# Patient Record
Sex: FEMALE | Race: WHITE | NOT HISPANIC OR LATINO | URBAN - NONMETROPOLITAN AREA
[De-identification: names, ages, dates, MRNs, and addresses within clinical notes are randomized per-mention and may not be internally consistent; named-entity substitution may affect disease eponyms.]

---

## 2020-08-31 ENCOUNTER — WEB ENCOUNTER (OUTPATIENT)
Dept: URBAN - NONMETROPOLITAN AREA CLINIC 2 | Facility: CLINIC | Age: 70
End: 2020-08-31

## 2020-08-31 ENCOUNTER — LAB OUTSIDE AN ENCOUNTER (OUTPATIENT)
Dept: URBAN - NONMETROPOLITAN AREA CLINIC 2 | Facility: CLINIC | Age: 70
End: 2020-08-31

## 2020-08-31 ENCOUNTER — OFFICE VISIT (OUTPATIENT)
Dept: URBAN - NONMETROPOLITAN AREA CLINIC 2 | Facility: CLINIC | Age: 70
End: 2020-08-31
Payer: MEDICARE

## 2020-08-31 DIAGNOSIS — K50.10 CROHN'S DISEASE OF COLON WITHOUT COMPLICATION: ICD-10-CM

## 2020-08-31 PROCEDURE — 99204 OFFICE O/P NEW MOD 45 MIN: CPT | Performed by: NURSE PRACTITIONER

## 2020-08-31 PROCEDURE — 1036F TOBACCO NON-USER: CPT | Performed by: NURSE PRACTITIONER

## 2020-08-31 PROCEDURE — 3017F COLORECTAL CA SCREEN DOC REV: CPT | Performed by: NURSE PRACTITIONER

## 2020-08-31 PROCEDURE — G8427 DOCREV CUR MEDS BY ELIG CLIN: HCPCS | Performed by: NURSE PRACTITIONER

## 2020-08-31 RX ORDER — ADALIMUMAB 40MG/0.8ML
0.8 ML KIT SUBCUTANEOUS EVERY OTHER WEEK
Status: ACTIVE | COMMUNITY

## 2020-08-31 RX ORDER — LORATADINE 10 MG
1 TABLET TABLET ORAL ONCE A DAY
Status: ACTIVE | COMMUNITY

## 2020-08-31 RX ORDER — ADALIMUMAB 40MG/0.4ML
EVERY 2 WEEKS KIT SUBCUTANEOUS
Qty: 2 PRE-FILLED PEN SYRINGE | Refills: 11 | OUTPATIENT
Start: 2020-08-31 | End: 2021-08-26

## 2020-08-31 RX ORDER — SITAGLIPTIN AND METFORMIN HYDROCHLORIDE 50; 1000 MG/1; MG/1
1 TABLET WITH MEALS TABLET, FILM COATED ORAL TWICE A DAY
Status: ACTIVE | COMMUNITY

## 2020-08-31 RX ORDER — ARIPIPRAZOLE 5 MG/1
1/2 TABLET TABLET ORAL ONCE A DAY
Status: ACTIVE | COMMUNITY

## 2020-08-31 RX ORDER — CYANOCOBALAMIN 1000 UG/ML
1 ML INJECTION INTRAMUSCULAR; SUBCUTANEOUS
Status: ACTIVE | COMMUNITY

## 2020-08-31 RX ORDER — GABAPENTIN 300 MG/1
1 CAPSULE CAPSULE ORAL TWICE DAILY
Status: ACTIVE | COMMUNITY

## 2020-08-31 RX ORDER — VITAMIN A 2400 MCG
1 TABLET CAPSULE ORAL TWICE DAILY
Status: ACTIVE | COMMUNITY

## 2020-08-31 RX ORDER — LORAZEPAM 0.5 MG/1
1 TABLET TABLET ORAL AS NEEDED
Status: ACTIVE | COMMUNITY

## 2020-08-31 RX ORDER — LAMOTRIGINE 100 MG/1
1 TABLET TABLET ORAL ONCE A DAY
Status: ACTIVE | COMMUNITY

## 2020-08-31 RX ORDER — BUPROPION HYDROCHLORIDE 300 MG/1
1 TABLET IN THE MORNING TABLET, FILM COATED, EXTENDED RELEASE ORAL ONCE A DAY
Status: ACTIVE | COMMUNITY

## 2020-08-31 RX ORDER — GLIMEPIRIDE 4 MG/1
1 TABLET WITH BREAKFAST OR THE FIRST MAIN MEAL OF THE DAY TABLET ORAL TWICE DAILY
Status: ACTIVE | COMMUNITY

## 2020-08-31 NOTE — HPI-TODAY'S VISIT:
Marybeth presents for evaluation of Crohn's colitis.  She was originally diagnosed in 2018 by Dr. Robbin Mix per her report, she states that she developed acute diarrhea and was told that she had Crohn's colitis.  She was never treated with any remission therapy, she thinks that she took steroids.  She had no issues over the past 2 years but recently has had increased tenesmus and urgency with incontinence.  She had a repeat colonoscopy in June by Dr. Robbin Mix with active Crohn's colitis.  He recommended Humira which she started 2 weeks ago.  She has taken her first 2 induction doses, she is due for maintenance.  She is switching providers given issues with communication with Dr. Mix's office.  She states that she has a bowel movement most days of the week with urgency and tenesmus, at times she has 2-3 urgent bowel movements in a day, however she does have episodes of incomplete evacuation in between these periods.  She denies any mucus or bleeding.  We do not have a copy of her records from Dr. Mix.  She has not had any blood work recently.  Today she is here to establish care, and she is otherwise doing well.  MB

## 2020-09-01 LAB
A/G RATIO: 1.8
ALBUMIN: 4.6
ALKALINE PHOSPHATASE: 71
ALT (SGPT): 24
AST (SGOT): 22
BASO (ABSOLUTE): 0.1
BASOS: 1
BILIRUBIN, TOTAL: 0.3
BUN/CREATININE RATIO: 17
BUN: 18
C-REACTIVE PROTEIN, QUANT: 2
CALCIUM: 10.2
CARBON DIOXIDE, TOTAL: 21
CHLORIDE: 100
CREATININE: 1.04
EGFR IF AFRICN AM: 64
EGFR IF NONAFRICN AM: 56
EOS (ABSOLUTE): 0.3
EOS: 4
GLOBULIN, TOTAL: 2.5
GLUCOSE: 140
HEMATOCRIT: 42
HEMATOLOGY COMMENTS:: (no result)
HEMOGLOBIN: 13
IMMATURE CELLS: (no result)
IMMATURE GRANS (ABS): 0
IMMATURE GRANULOCYTES: 0
LYMPHS (ABSOLUTE): 3.1
LYMPHS: 33
MCH: 26.2
MCHC: 31
MCV: 85
MONOCYTES(ABSOLUTE): 0.6
MONOCYTES: 7
NEUTROPHILS (ABSOLUTE): 5.2
NEUTROPHILS: 55
NRBC: (no result)
PLATELETS: 197
POTASSIUM: 4.7
PROTEIN, TOTAL: 7.1
RBC: 4.97
RDW: 15
SEDIMENTATION RATE-WESTERGREN: 14
SODIUM: 139
WBC: 9.3

## 2020-09-08 ENCOUNTER — OFFICE VISIT (OUTPATIENT)
Dept: URBAN - METROPOLITAN AREA TELEHEALTH 2 | Facility: TELEHEALTH | Age: 70
End: 2020-09-08

## 2020-10-19 ENCOUNTER — OFFICE VISIT (OUTPATIENT)
Dept: URBAN - NONMETROPOLITAN AREA CLINIC 2 | Facility: CLINIC | Age: 70
End: 2020-10-19
Payer: MEDICARE

## 2020-10-19 ENCOUNTER — TELEPHONE ENCOUNTER (OUTPATIENT)
Dept: URBAN - METROPOLITAN AREA CLINIC 92 | Facility: CLINIC | Age: 70
End: 2020-10-19

## 2020-10-19 VITALS
BODY MASS INDEX: 44.99 KG/M2 | TEMPERATURE: 97.8 F | DIASTOLIC BLOOD PRESSURE: 72 MMHG | SYSTOLIC BLOOD PRESSURE: 124 MMHG | HEIGHT: 56 IN | HEART RATE: 66 BPM | WEIGHT: 200 LBS

## 2020-10-19 DIAGNOSIS — K50.10 CROHN'S DISEASE OF COLON WITHOUT COMPLICATION: ICD-10-CM

## 2020-10-19 PROCEDURE — G8427 DOCREV CUR MEDS BY ELIG CLIN: HCPCS | Performed by: NURSE PRACTITIONER

## 2020-10-19 PROCEDURE — 99213 OFFICE O/P EST LOW 20 MIN: CPT | Performed by: NURSE PRACTITIONER

## 2020-10-19 PROCEDURE — 1036F TOBACCO NON-USER: CPT | Performed by: NURSE PRACTITIONER

## 2020-10-19 PROCEDURE — 3017F COLORECTAL CA SCREEN DOC REV: CPT | Performed by: NURSE PRACTITIONER

## 2020-10-19 RX ORDER — LORATADINE 10 MG
1 TABLET TABLET ORAL ONCE A DAY
Status: ACTIVE | COMMUNITY

## 2020-10-19 RX ORDER — CYANOCOBALAMIN 1000 UG/ML
1 ML INJECTION INTRAMUSCULAR; SUBCUTANEOUS
Status: ACTIVE | COMMUNITY

## 2020-10-19 RX ORDER — LORAZEPAM 0.5 MG/1
1 TABLET TABLET ORAL AS NEEDED
Status: ACTIVE | COMMUNITY

## 2020-10-19 RX ORDER — ADALIMUMAB 40MG/0.4ML
EVERY 2 WEEKS KIT SUBCUTANEOUS
Qty: 2 PRE-FILLED PEN SYRINGE | Refills: 11 | OUTPATIENT

## 2020-10-19 RX ORDER — ADALIMUMAB 40MG/0.8ML
0.8 ML KIT SUBCUTANEOUS EVERY OTHER WEEK
Status: ACTIVE | COMMUNITY

## 2020-10-19 RX ORDER — SITAGLIPTIN AND METFORMIN HYDROCHLORIDE 50; 1000 MG/1; MG/1
1 TABLET WITH MEALS TABLET, FILM COATED ORAL TWICE A DAY
Status: ACTIVE | COMMUNITY

## 2020-10-19 RX ORDER — ADALIMUMAB 40MG/0.4ML
EVERY 2 WEEKS KIT SUBCUTANEOUS
Qty: 2 PRE-FILLED PEN SYRINGE | Refills: 11 | Status: ACTIVE | COMMUNITY
Start: 2020-08-31 | End: 2021-08-26

## 2020-10-19 RX ORDER — GLIMEPIRIDE 4 MG/1
1 TABLET WITH BREAKFAST OR THE FIRST MAIN MEAL OF THE DAY TABLET ORAL TWICE DAILY
Status: ACTIVE | COMMUNITY

## 2020-10-19 RX ORDER — BUPROPION HYDROCHLORIDE 300 MG/1
1 TABLET IN THE MORNING TABLET, FILM COATED, EXTENDED RELEASE ORAL ONCE A DAY
Status: ACTIVE | COMMUNITY

## 2020-10-19 RX ORDER — LAMOTRIGINE 100 MG/1
1 TABLET TABLET ORAL ONCE A DAY
Status: ACTIVE | COMMUNITY

## 2020-10-19 RX ORDER — ARIPIPRAZOLE 5 MG/1
1/2 TABLET TABLET ORAL ONCE A DAY
Status: ACTIVE | COMMUNITY

## 2020-10-19 RX ORDER — GABAPENTIN 300 MG/1
1 CAPSULE CAPSULE ORAL TWICE DAILY
Status: ACTIVE | COMMUNITY

## 2020-10-19 RX ORDER — VITAMIN A 2400 MCG
1 TABLET CAPSULE ORAL TWICE DAILY
Status: ACTIVE | COMMUNITY

## 2020-10-19 NOTE — HPI-TODAY'S VISIT:
Marybeth presents for evaluation of Crohn's colitis.  She was originally diagnosed in 2018 by Dr. Robbin Mix per her report, she states that she developed acute diarrhea and was told that she had Crohn's colitis.  She was never treated with any remission therapy, she thinks that she took steroids.  She had no issues over the past 2 years but recently has had increased tenesmus and urgency with incontinence.  She had a repeat colonoscopy in June by Dr. Robbin Mix with active Crohn's colitis.  He recommended Humira which she started 2 weeks ago.  She has taken her first 2 induction doses, she is due for maintenance.  She is switching providers given issues with communication with Dr. Mix's office.  She states that she has a bowel movement most days of the week with urgency and tenesmus, at times she has 2-3 urgent bowel movements in a day, however she does have episodes of incomplete evacuation in between these periods.  She denies any mucus or bleeding.  We do not have a copy of her records from Dr. Mix.  She has not had any blood work recently.  Today she is here to establish care, and she is otherwise doing well.  MB  10/19/2020 Marybeth presents for follow-up of Crohn's colitis.  Since her last visit we did get Dr. Mix's office notes but have not received his colonoscopy or pathology results.  She is doing well on Humira every other week with no complaints regarding her injection.  Now her bowels are more alternating, she will have no bowel movement for 2 to 3 days, passed a large hard stool and then have some loose stool after this.  The cycle will start over following.  She did meet with Sue and is trying to follow an anti-inflammatory diet.  She denies any mucus or bleeding associated with the symptoms.  She has not had any blood work since her last visit, today she is doing well otherwise.  MB

## 2020-10-20 LAB
A/G RATIO: 1.7
ALBUMIN: 4.3
ALKALINE PHOSPHATASE: 60
ALT (SGPT): 26
AST (SGOT): 20
BASO (ABSOLUTE): 0.1
BASOS: 1
BILIRUBIN, TOTAL: 0.3
BUN/CREATININE RATIO: 17
BUN: 18
C-REACTIVE PROTEIN, QUANT: <1
CALCIUM: 10.1
CARBON DIOXIDE, TOTAL: 24
CHLORIDE: 102
CREATININE: 1.07
EGFR IF AFRICN AM: 62
EGFR IF NONAFRICN AM: 54
EOS (ABSOLUTE): 0.3
EOS: 3
GLOBULIN, TOTAL: 2.5
GLUCOSE: 179
HEMATOCRIT: 37.3
HEMATOLOGY COMMENTS:: (no result)
HEMOGLOBIN: 12.3
IMMATURE CELLS: (no result)
IMMATURE GRANS (ABS): 0
IMMATURE GRANULOCYTES: 0
LYMPHS (ABSOLUTE): 3.4
LYMPHS: 42
MCH: 28.7
MCHC: 33
MCV: 87
MONOCYTES(ABSOLUTE): 0.6
MONOCYTES: 7
NEUTROPHILS (ABSOLUTE): 3.8
NEUTROPHILS: 47
NRBC: (no result)
PLATELETS: 186
POTASSIUM: 4.7
PROTEIN, TOTAL: 6.8
RBC: 4.28
RDW: 15.4
SEDIMENTATION RATE-WESTERGREN: 18
SODIUM: 141
WBC: 8

## 2021-01-11 ENCOUNTER — OFFICE VISIT (OUTPATIENT)
Dept: URBAN - NONMETROPOLITAN AREA CLINIC 2 | Facility: CLINIC | Age: 71
End: 2021-01-11
Payer: MEDICARE

## 2021-01-11 VITALS
SYSTOLIC BLOOD PRESSURE: 137 MMHG | BODY MASS INDEX: 44.99 KG/M2 | HEART RATE: 60 BPM | HEIGHT: 56 IN | DIASTOLIC BLOOD PRESSURE: 74 MMHG | WEIGHT: 200 LBS | TEMPERATURE: 96.2 F

## 2021-01-11 DIAGNOSIS — Z12.11 COLON CANCER SCREENING: ICD-10-CM

## 2021-01-11 DIAGNOSIS — K50.10 CROHN'S DISEASE OF COLON WITHOUT COMPLICATION: ICD-10-CM

## 2021-01-11 PROCEDURE — G8427 DOCREV CUR MEDS BY ELIG CLIN: HCPCS | Performed by: NURSE PRACTITIONER

## 2021-01-11 PROCEDURE — 99213 OFFICE O/P EST LOW 20 MIN: CPT | Performed by: NURSE PRACTITIONER

## 2021-01-11 PROCEDURE — 4004F PT TOBACCO SCREEN RCVD TLK: CPT | Performed by: NURSE PRACTITIONER

## 2021-01-11 PROCEDURE — 3017F COLORECTAL CA SCREEN DOC REV: CPT | Performed by: NURSE PRACTITIONER

## 2021-01-11 RX ORDER — ADALIMUMAB 40MG/0.4ML
EVERY 2 WEEKS KIT SUBCUTANEOUS
Qty: 2 PRE-FILLED PEN SYRINGE | Refills: 11 | OUTPATIENT

## 2021-01-11 RX ORDER — ADALIMUMAB 40MG/0.4ML
EVERY 2 WEEKS KIT SUBCUTANEOUS
Qty: 2 PRE-FILLED PEN SYRINGE | Refills: 11 | Status: ACTIVE | COMMUNITY

## 2021-01-11 RX ORDER — GABAPENTIN 300 MG/1
1 CAPSULE CAPSULE ORAL TWICE DAILY
Status: ACTIVE | COMMUNITY

## 2021-01-11 RX ORDER — GLIMEPIRIDE 4 MG/1
1 TABLET WITH BREAKFAST OR THE FIRST MAIN MEAL OF THE DAY TABLET ORAL TWICE DAILY
Status: ACTIVE | COMMUNITY

## 2021-01-11 RX ORDER — ARIPIPRAZOLE 5 MG/1
1/2 TABLET TABLET ORAL ONCE A DAY
Status: ACTIVE | COMMUNITY

## 2021-01-11 RX ORDER — BUPROPION HYDROCHLORIDE 300 MG/1
1 TABLET IN THE MORNING TABLET, FILM COATED, EXTENDED RELEASE ORAL ONCE A DAY
Status: ACTIVE | COMMUNITY

## 2021-01-11 RX ORDER — FERROUS SULFATE 325(65) MG
1 TABLET TABLET ORAL BID
Qty: 60 TABLET | Refills: 11 | OUTPATIENT
Start: 2021-01-11

## 2021-01-11 RX ORDER — CYANOCOBALAMIN 1000 UG/ML
1 ML INJECTION INTRAMUSCULAR; SUBCUTANEOUS
Status: ACTIVE | COMMUNITY

## 2021-01-11 RX ORDER — DOCUSATE SODIUM 100 MG/1
1 CAPSULE AS NEEDED CAPSULE ORAL ONCE A DAY
Qty: 30 CAPSULE | Refills: 11 | OUTPATIENT
Start: 2021-01-11 | End: 2022-01-06

## 2021-01-11 RX ORDER — SITAGLIPTIN AND METFORMIN HYDROCHLORIDE 50; 1000 MG/1; MG/1
1 TABLET WITH MEALS TABLET, FILM COATED ORAL TWICE A DAY
Status: ACTIVE | COMMUNITY

## 2021-01-11 RX ORDER — VITAMIN A 2400 MCG
1 TABLET CAPSULE ORAL TWICE DAILY
Status: ACTIVE | COMMUNITY

## 2021-01-11 RX ORDER — LORAZEPAM 0.5 MG/1
1 TABLET TABLET ORAL AS NEEDED
Status: ACTIVE | COMMUNITY

## 2021-01-11 RX ORDER — ADALIMUMAB 40MG/0.8ML
0.8 ML KIT SUBCUTANEOUS EVERY OTHER WEEK
Status: ACTIVE | COMMUNITY

## 2021-01-11 RX ORDER — LAMOTRIGINE 100 MG/1
1 TABLET TABLET ORAL ONCE A DAY
Status: ACTIVE | COMMUNITY

## 2021-01-11 RX ORDER — LORATADINE 10 MG
1 TABLET TABLET ORAL ONCE A DAY
Status: ACTIVE | COMMUNITY

## 2021-01-11 NOTE — HPI-TODAY'S VISIT:
Marybeth presents for evaluation of Crohn's colitis.  She was originally diagnosed in 2018 by Dr. Robbin Mxi per her report, she states that she developed acute diarrhea and was told that she had Crohn's colitis.  She was never treated with any remission therapy, she thinks that she took steroids.  She had no issues over the past 2 years but recently has had increased tenesmus and urgency with incontinence.  She had a repeat colonoscopy in June by Dr. Robbin Mix with active Crohn's colitis.  He recommended Humira which she started 2 weeks ago.  She has taken her first 2 induction doses, she is due for maintenance.  She is switching providers given issues with communication with Dr. Mix's office.  She states that she has a bowel movement most days of the week with urgency and tenesmus, at times she has 2-3 urgent bowel movements in a day, however she does have episodes of incomplete evacuation in between these periods.  She denies any mucus or bleeding.  We do not have a copy of her records from Dr. Mix.  She has not had any blood work recently.  Today she is here to establish care, and she is otherwise doing well.  MB   10/19/2020 Marybeth presents for follow-up of Crohn's colitis.  Since her last visit we did get Dr. Mix's office notes but have not received his colonoscopy or pathology results.  She is doing well on Humira every other week with no complaints regarding her injection.  Now her bowels are more alternating, she will have no bowel movement for 2 to 3 days, passed a large hard stool and then have some loose stool after this.  The cycle will start over following.  She did meet with Sue and is trying to follow an anti-inflammatory diet.  She denies any mucus or bleeding associated with the symptoms.  She has not had any blood work since her last visit, today she is doing well otherwise.  MB   1/11/2021  Marybeth presents for followup of Crohn's colitis. She is doing well today on Humira every other week. She has no significant diarrhea so long as she maintains a low fiber diet. She is due for lab work. She is moving into [place] and will need a paper rx for some prescriptions. Today she is doing well otherwise. MB

## 2021-01-12 LAB
A/G RATIO: 1.6
ALBUMIN: 4.3
ALKALINE PHOSPHATASE: 58
ALT (SGPT): 24
AST (SGOT): 19
BASO (ABSOLUTE): 0.1
BASOS: 1
BILIRUBIN, TOTAL: 0.3
BUN/CREATININE RATIO: 18
BUN: 16
C-REACTIVE PROTEIN, QUANT: 1
CALCIUM: 9.9
CARBON DIOXIDE, TOTAL: 24
CHLORIDE: 100
CREATININE: 0.87
EGFR IF AFRICN AM: 79
EGFR IF NONAFRICN AM: 69
EOS (ABSOLUTE): 0.4
EOS: 5
GLOBULIN, TOTAL: 2.7
GLUCOSE: 126
HEMATOCRIT: 42.3
HEMATOLOGY COMMENTS:: (no result)
HEMOGLOBIN: 14.3
IMMATURE CELLS: (no result)
IMMATURE GRANS (ABS): 0
IMMATURE GRANULOCYTES: 0
LYMPHS (ABSOLUTE): 3.7
LYMPHS: 45
MCH: 30.6
MCHC: 33.8
MCV: 91
MONOCYTES(ABSOLUTE): 0.7
MONOCYTES: 8
NEUTROPHILS (ABSOLUTE): 3.4
NEUTROPHILS: 41
NRBC: (no result)
PLATELETS: 171
POTASSIUM: 4.6
PROTEIN, TOTAL: 7
RBC: 4.67
RDW: 13
SEDIMENTATION RATE-WESTERGREN: 8
SODIUM: 137
VITAMIN B12: 777
WBC: 8.2

## 2021-05-19 ENCOUNTER — TELEPHONE ENCOUNTER (OUTPATIENT)
Dept: URBAN - NONMETROPOLITAN AREA CLINIC 2 | Facility: CLINIC | Age: 71
End: 2021-05-19

## 2021-05-19 RX ORDER — ADALIMUMAB 40MG/0.4ML
EVERY 2 WEEKS KIT SUBCUTANEOUS
Qty: 2 PRE-FILLED PEN SYRINGE | Refills: 11

## 2021-07-12 ENCOUNTER — LAB OUTSIDE AN ENCOUNTER (OUTPATIENT)
Dept: URBAN - NONMETROPOLITAN AREA CLINIC 2 | Facility: CLINIC | Age: 71
End: 2021-07-12

## 2021-07-12 ENCOUNTER — OFFICE VISIT (OUTPATIENT)
Dept: URBAN - NONMETROPOLITAN AREA CLINIC 2 | Facility: CLINIC | Age: 71
End: 2021-07-12
Payer: MEDICARE

## 2021-07-12 ENCOUNTER — WEB ENCOUNTER (OUTPATIENT)
Dept: URBAN - NONMETROPOLITAN AREA CLINIC 2 | Facility: CLINIC | Age: 71
End: 2021-07-12

## 2021-07-12 VITALS
DIASTOLIC BLOOD PRESSURE: 81 MMHG | BODY MASS INDEX: 44.99 KG/M2 | WEIGHT: 200 LBS | HEART RATE: 66 BPM | HEIGHT: 56 IN | TEMPERATURE: 97.9 F | SYSTOLIC BLOOD PRESSURE: 134 MMHG

## 2021-07-12 DIAGNOSIS — K50.10 CROHN'S DISEASE OF COLON WITHOUT COMPLICATION: ICD-10-CM

## 2021-07-12 DIAGNOSIS — Z12.11 COLON CANCER SCREENING: ICD-10-CM

## 2021-07-12 PROCEDURE — 99214 OFFICE O/P EST MOD 30 MIN: CPT | Performed by: NURSE PRACTITIONER

## 2021-07-12 RX ORDER — GLIMEPIRIDE 4 MG/1
1 TABLET WITH BREAKFAST OR THE FIRST MAIN MEAL OF THE DAY TABLET ORAL TWICE DAILY
Status: ACTIVE | COMMUNITY

## 2021-07-12 RX ORDER — FERROUS SULFATE 325(65) MG
1 TABLET TABLET ORAL BID
Qty: 60 TABLET | Refills: 11 | Status: ACTIVE | COMMUNITY
Start: 2021-01-11

## 2021-07-12 RX ORDER — BUPROPION HYDROCHLORIDE 300 MG/1
1 TABLET IN THE MORNING TABLET, FILM COATED, EXTENDED RELEASE ORAL ONCE A DAY
Status: ACTIVE | COMMUNITY

## 2021-07-12 RX ORDER — SODIUM PICOSULFATE, MAGNESIUM OXIDE, AND ANHYDROUS CITRIC ACID 10; 3.5; 12 MG/160ML; G/160ML; G/160ML
160 ML LIQUID ORAL
Qty: 320 MILLILITER | Refills: 0 | OUTPATIENT
Start: 2021-07-12 | End: 2021-07-13

## 2021-07-12 RX ORDER — VITAMIN A 2400 MCG
1 TABLET CAPSULE ORAL TWICE DAILY
Status: ACTIVE | COMMUNITY

## 2021-07-12 RX ORDER — ADALIMUMAB 40MG/0.4ML
EVERY 2 WEEKS KIT SUBCUTANEOUS
Qty: 2 PRE-FILLED PEN SYRINGE | Refills: 11 | Status: ACTIVE | COMMUNITY

## 2021-07-12 RX ORDER — ARIPIPRAZOLE 5 MG/1
1/2 TABLET TABLET ORAL ONCE A DAY
Status: ACTIVE | COMMUNITY

## 2021-07-12 RX ORDER — LAMOTRIGINE 100 MG/1
1 TABLET TABLET ORAL ONCE A DAY
Status: ACTIVE | COMMUNITY

## 2021-07-12 RX ORDER — CYANOCOBALAMIN 1000 UG/ML
1 ML INJECTION INTRAMUSCULAR; SUBCUTANEOUS
Status: ACTIVE | COMMUNITY

## 2021-07-12 RX ORDER — DOCUSATE SODIUM 100 MG/1
1 CAPSULE AS NEEDED CAPSULE ORAL ONCE A DAY
Qty: 30 CAPSULE | Refills: 11 | Status: ACTIVE | COMMUNITY
Start: 2021-01-11 | End: 2022-01-06

## 2021-07-12 RX ORDER — GABAPENTIN 300 MG/1
1 CAPSULE CAPSULE ORAL TWICE DAILY
Status: ACTIVE | COMMUNITY

## 2021-07-12 RX ORDER — LORAZEPAM 0.5 MG/1
1 TABLET TABLET ORAL AS NEEDED
Status: ACTIVE | COMMUNITY

## 2021-07-12 RX ORDER — LORATADINE 10 MG
1 TABLET TABLET ORAL ONCE A DAY
Status: ACTIVE | COMMUNITY

## 2021-07-12 RX ORDER — ADALIMUMAB 40MG/0.8ML
0.8 ML KIT SUBCUTANEOUS EVERY OTHER WEEK
Status: ACTIVE | COMMUNITY

## 2021-07-12 RX ORDER — SITAGLIPTIN AND METFORMIN HYDROCHLORIDE 50; 1000 MG/1; MG/1
1 TABLET WITH MEALS TABLET, FILM COATED ORAL TWICE A DAY
Status: ACTIVE | COMMUNITY

## 2021-07-12 NOTE — HPI-TODAY'S VISIT:
Marybeth presents for evaluation of Crohn's colitis.  She was originally diagnosed in 2018 by Dr. Robbin Mix per her report, she states that she developed acute diarrhea and was told that she had Crohn's colitis.  She was never treated with any remission therapy, she thinks that she took steroids.  She had no issues over the past 2 years but recently has had increased tenesmus and urgency with incontinence.  She had a repeat colonoscopy in June by Dr. Robbin Mix with active Crohn's colitis.  He recommended Humira which she started 2 weeks ago.  She has taken her first 2 induction doses, she is due for maintenance.  She is switching providers given issues with communication with Dr. Mix's office.  She states that she has a bowel movement most days of the week with urgency and tenesmus, at times she has 2-3 urgent bowel movements in a day, however she does have episodes of incomplete evacuation in between these periods.  She denies any mucus or bleeding.  We do not have a copy of her records from Dr. Mix.  She has not had any blood work recently.  Today she is here to establish care, and she is otherwise doing well.  MB   10/19/2020 Marybeth presents for follow-up of Crohn's colitis.  Since her last visit we did get Dr. Mix's office notes but have not received his colonoscopy or pathology results.  She is doing well on Humira every other week with no complaints regarding her injection.  Now her bowels are more alternating, she will have no bowel movement for 2 to 3 days, passed a large hard stool and then have some loose stool after this.  The cycle will start over following.  She did meet with Sue and is trying to follow an anti-inflammatory diet.  She denies any mucus or bleeding associated with the symptoms.  She has not had any blood work since her last visit, today she is doing well otherwise.  MB   1/11/2021  Marybeth presents for followup of Crohn's colitis. She is doing well today on Humira every other week. She has no significant diarrhea so long as she maintains a low fiber diet. She is due for lab work. She is moving into [place] and will need a paper rx for some prescriptions. Today she is doing well otherwise. MB  7/12/2021 Mrs. Cho presents for follow-up of Crohn's disease.  Since her last visit she has been doing well in regard to her disease.  She is on Colace daily with no symptoms of flare.  She denies any abdominal pain or rectal bleeding.  She has had some mild local reactions to her injections with some swelling.  She has no systemic complaints.  She is in the [place] study locally at [facility] following with her Covid antibodies.  She now has low antibody levels.  She has been recommended for an immunologic work-up per the [place] team.  Her last colonoscopy was done in 2019 by Dr. Robbin Mix with Crohn's colitis.  We still do not have the actual report but we do have his office notes with moderate active disease.  Today she agrees to repeat colonoscopy for surveillance, she does agree to Humira serologies to ensure no antibody development given local reactions, and we have discussed referral to allergy and immunology for evaluation of poor response to the Covid vaccine.  MB

## 2021-07-19 ENCOUNTER — TELEPHONE ENCOUNTER (OUTPATIENT)
Dept: URBAN - NONMETROPOLITAN AREA CLINIC 2 | Facility: CLINIC | Age: 71
End: 2021-07-19

## 2021-07-20 LAB
A/G RATIO: 1.6
ADALIMUMAB DRUG LEVEL: 7
ALBUMIN: 4.4
ALKALINE PHOSPHATASE: 58
ALT (SGPT): 26
ANTI-ADALIMUMAB ANTIBODY: <25
AST (SGOT): 24
BASO (ABSOLUTE): 0
BASOS: 1
BILIRUBIN, TOTAL: 0.3
BUN/CREATININE RATIO: 18
BUN: 16
C-REACTIVE PROTEIN, QUANT: 1
CALCIUM: 9.7
CARBON DIOXIDE, TOTAL: 24
CHLORIDE: 102
CREATININE: 0.91
EGFR IF AFRICN AM: 75
EGFR IF NONAFRICN AM: 65
EOS (ABSOLUTE): 0.3
EOS: 4
GLOBULIN, TOTAL: 2.7
GLUCOSE: 203
HEMATOCRIT: 41.1
HEMATOLOGY COMMENTS:: (no result)
HEMOGLOBIN: 13.5
IMMATURE CELLS: (no result)
IMMATURE GRANS (ABS): 0
IMMATURE GRANULOCYTES: 1
LYMPHS (ABSOLUTE): 2.8
LYMPHS: 39
MCH: 29.9
MCHC: 32.8
MCV: 91
MONOCYTES(ABSOLUTE): 0.5
MONOCYTES: 7
NEUTROPHILS (ABSOLUTE): 3.5
NEUTROPHILS: 48
NRBC: (no result)
PLATELETS: 185
POTASSIUM: 4.8
PROTEIN, TOTAL: 7.1
RBC: 4.51
RDW: 12.6
SEDIMENTATION RATE-WESTERGREN: 10
SODIUM: 139
VITAMIN B12: 633
VITAMIN D, 25-HYDROXY: 70.7
WBC: 7.1

## 2021-08-26 ENCOUNTER — OFFICE VISIT (OUTPATIENT)
Dept: URBAN - NONMETROPOLITAN AREA SURGERY CENTER 1 | Facility: SURGERY CENTER | Age: 71
End: 2021-08-26

## 2021-10-07 ENCOUNTER — TELEPHONE ENCOUNTER (OUTPATIENT)
Dept: URBAN - NONMETROPOLITAN AREA CLINIC 2 | Facility: CLINIC | Age: 71
End: 2021-10-07

## 2021-10-11 ENCOUNTER — OFFICE VISIT (OUTPATIENT)
Dept: URBAN - NONMETROPOLITAN AREA CLINIC 2 | Facility: CLINIC | Age: 71
End: 2021-10-11

## 2021-10-27 ENCOUNTER — OFFICE VISIT (OUTPATIENT)
Dept: URBAN - NONMETROPOLITAN AREA SURGERY CENTER 1 | Facility: SURGERY CENTER | Age: 71
End: 2021-10-27
Payer: MEDICARE

## 2021-10-27 ENCOUNTER — CLAIMS CREATED FROM THE CLAIM WINDOW (OUTPATIENT)
Dept: URBAN - METROPOLITAN AREA CLINIC 4 | Facility: CLINIC | Age: 71
End: 2021-10-27
Payer: MEDICARE

## 2021-10-27 DIAGNOSIS — K51.40 INFLAMMATORY POLYPS OF COLON WITHOUT COMPLICATIONS: ICD-10-CM

## 2021-10-27 DIAGNOSIS — K50.10 CC (CROHN'S COLITIS): ICD-10-CM

## 2021-10-27 DIAGNOSIS — K50.119 CROHN'S DISEASE OF LARGE INTESTINE WITH UNSPECIFIED COMPLICATIONS: ICD-10-CM

## 2021-10-27 DIAGNOSIS — K63.89 BACTERIAL OVERGROWTH SYNDROME: ICD-10-CM

## 2021-10-27 PROCEDURE — G8907 PT DOC NO EVENTS ON DISCHARG: HCPCS | Performed by: INTERNAL MEDICINE

## 2021-10-27 PROCEDURE — 45385 COLONOSCOPY W/LESION REMOVAL: CPT | Performed by: INTERNAL MEDICINE

## 2021-10-27 PROCEDURE — 88342 IMHCHEM/IMCYTCHM 1ST ANTB: CPT | Performed by: PATHOLOGY

## 2021-10-27 PROCEDURE — 45380 COLONOSCOPY AND BIOPSY: CPT | Performed by: INTERNAL MEDICINE

## 2021-10-27 PROCEDURE — 88341 IMHCHEM/IMCYTCHM EA ADD ANTB: CPT | Performed by: PATHOLOGY

## 2021-10-27 PROCEDURE — 88305 TISSUE EXAM BY PATHOLOGIST: CPT | Performed by: PATHOLOGY

## 2021-11-30 ENCOUNTER — OFFICE VISIT (OUTPATIENT)
Dept: URBAN - NONMETROPOLITAN AREA CLINIC 2 | Facility: CLINIC | Age: 71
End: 2021-11-30
Payer: MEDICARE

## 2021-11-30 VITALS
SYSTOLIC BLOOD PRESSURE: 147 MMHG | DIASTOLIC BLOOD PRESSURE: 74 MMHG | HEART RATE: 64 BPM | WEIGHT: 200 LBS | BODY MASS INDEX: 44.99 KG/M2 | TEMPERATURE: 97.1 F | HEIGHT: 56 IN

## 2021-11-30 DIAGNOSIS — K50.10 CROHN'S DISEASE OF COLON WITHOUT COMPLICATION: ICD-10-CM

## 2021-11-30 DIAGNOSIS — Z12.11 COLON CANCER SCREENING: ICD-10-CM

## 2021-11-30 PROCEDURE — 99214 OFFICE O/P EST MOD 30 MIN: CPT | Performed by: NURSE PRACTITIONER

## 2021-11-30 RX ORDER — SITAGLIPTIN AND METFORMIN HYDROCHLORIDE 50; 1000 MG/1; MG/1
1 TABLET WITH MEALS TABLET, FILM COATED ORAL TWICE A DAY
Status: ACTIVE | COMMUNITY

## 2021-11-30 RX ORDER — CYANOCOBALAMIN 1000 UG/ML
1 ML INJECTION INTRAMUSCULAR; SUBCUTANEOUS
Status: ACTIVE | COMMUNITY

## 2021-11-30 RX ORDER — LAMOTRIGINE 100 MG/1
1 TABLET TABLET ORAL ONCE A DAY
Status: ACTIVE | COMMUNITY

## 2021-11-30 RX ORDER — GLIMEPIRIDE 4 MG/1
1 TABLET WITH BREAKFAST OR THE FIRST MAIN MEAL OF THE DAY TABLET ORAL TWICE DAILY
Status: ACTIVE | COMMUNITY

## 2021-11-30 RX ORDER — LORAZEPAM 0.5 MG/1
1 TABLET TABLET ORAL AS NEEDED
Status: ACTIVE | COMMUNITY

## 2021-11-30 RX ORDER — BUPROPION HYDROCHLORIDE 300 MG/1
1 TABLET IN THE MORNING TABLET, FILM COATED, EXTENDED RELEASE ORAL ONCE A DAY
Status: ACTIVE | COMMUNITY

## 2021-11-30 RX ORDER — ADALIMUMAB 40MG/0.4ML
EVERY 2 WEEKS KIT SUBCUTANEOUS
Qty: 2 PRE-FILLED PEN SYRINGE | Refills: 11 | Status: ACTIVE | COMMUNITY

## 2021-11-30 RX ORDER — ADALIMUMAB 40MG/0.8ML
0.8 ML KIT SUBCUTANEOUS EVERY OTHER WEEK
Status: ACTIVE | COMMUNITY

## 2021-11-30 RX ORDER — ARIPIPRAZOLE 5 MG/1
1/2 TABLET TABLET ORAL ONCE A DAY
Status: ACTIVE | COMMUNITY

## 2021-11-30 RX ORDER — LORATADINE 10 MG
1 TABLET TABLET ORAL ONCE A DAY
Status: ACTIVE | COMMUNITY

## 2021-11-30 RX ORDER — FERROUS SULFATE 325(65) MG
1 TABLET TABLET ORAL BID
Qty: 60 TABLET | Refills: 11 | Status: ACTIVE | COMMUNITY
Start: 2021-01-11

## 2021-11-30 RX ORDER — DOCUSATE SODIUM 100 MG/1
1 CAPSULE AS NEEDED CAPSULE ORAL ONCE A DAY
Qty: 30 CAPSULE | Refills: 11 | Status: ACTIVE | COMMUNITY
Start: 2021-01-11 | End: 2022-01-06

## 2021-11-30 RX ORDER — GABAPENTIN 300 MG/1
1 CAPSULE CAPSULE ORAL TWICE DAILY
Status: ACTIVE | COMMUNITY

## 2021-11-30 RX ORDER — VITAMIN A 2400 MCG
1 TABLET CAPSULE ORAL TWICE DAILY
Status: ACTIVE | COMMUNITY

## 2021-11-30 NOTE — HPI-TODAY'S VISIT:
Marybeth presents for evaluation of Crohn's colitis.  She was originally diagnosed in 2018 by Dr. Robbin Mix per her report, she states that she developed acute diarrhea and was told that she had Crohn's colitis.  She was never treated with any remission therapy, she thinks that she took steroids.  She had no issues over the past 2 years but recently has had increased tenesmus and urgency with incontinence.  She had a repeat colonoscopy in June by Dr. Robbin Mix with active Crohn's colitis.  He recommended Humira which she started 2 weeks ago.  She has taken her first 2 induction doses, she is due for maintenance.  She is switching providers given issues with communication with Dr. Mix's office.  She states that she has a bowel movement most days of the week with urgency and tenesmus, at times she has 2-3 urgent bowel movements in a day, however she does have episodes of incomplete evacuation in between these periods.  She denies any mucus or bleeding.  We do not have a copy of her records from Dr. Mix.  She has not had any blood work recently.  Today she is here to establish care, and she is otherwise doing well.  MB   10/19/2020 Marybeth presents for follow-up of Crohn's colitis.  Since her last visit we did get Dr. Mix's office notes but have not received his colonoscopy or pathology results.  She is doing well on Humira every other week with no complaints regarding her injection.  Now her bowels are more alternating, she will have no bowel movement for 2 to 3 days, passed a large hard stool and then have some loose stool after this.  The cycle will start over following.  She did meet with Sue and is trying to follow an anti-inflammatory diet.  She denies any mucus or bleeding associated with the symptoms.  She has not had any blood work since her last visit, today she is doing well otherwise.  MB   1/11/2021  Marybeth presents for followup of Crohn's colitis. She is doing well today on Humira every other week. She has no significant diarrhea so long as she maintains a low fiber diet. She is due for lab work. She is moving into [employer] and will need a paper rx for some prescriptions. Today she is doing well otherwise. MB  7/12/2021 Mrs. Cho presents for follow-up of Crohn's disease.  Since her last visit she has been doing well in regard to her disease.  She is on Colace daily with no symptoms of flare.  She denies any abdominal pain or rectal bleeding.  She has had some mild local reactions to her injections with some swelling.  She has no systemic complaints.  She is in the [place] study locally at [facility] following with her Covid antibodies.  She now has low antibody levels.  She has been recommended for an immunologic work-up per the [place] team.  Her last colonoscopy was done in 2019 by Dr. Robbin Mix with Crohn's colitis.  We still do not have the actual report but we do have his office notes with moderate active disease.  Today she agrees to repeat colonoscopy for surveillance, she does agree to Humira serologies to ensure no antibody development given local reactions, and we have discussed referral to allergy and immunology for evaluation of poor response to the Covid vaccine.  MB 11/30/2021 Marybeth presents for colonoscopy follow-up.  Her labs show no Humira antibodies, she does have low normal serum drug level.  Her colonoscopy reveals 3 inflammatory polyps, right-sided inflammation on colonoscopy, normal terminal ileum.  Her biopsy showed active inflammation in the left and right colon.  She is having 1-2 bowel movements daily.  These are soft at times however she has been on Colace twice daily.  Today we discussed decreasing that.  She denies any urgency, mucus or bleeding.  We did discuss her Humira serologies and that given her low normal serum drug level and active inflammation we could consider going to weekly dosing.  For now she wants to continue her biweekly dosing and continue to monitor her symptoms.  She agrees to try you serous and increase her Humira to weekly if she flares.  MB

## 2021-12-11 LAB
A/G RATIO: 1.6
ALBUMIN: 4.2
ALKALINE PHOSPHATASE: 69
ALT (SGPT): 28
AST (SGOT): 21
BASO (ABSOLUTE): 0.1
BASOS: 1
BILIRUBIN, TOTAL: 0.3
BUN/CREATININE RATIO: 17
BUN: 15
C-REACTIVE PROTEIN, QUANT: <1
CALCIUM: 9.9
CARBON DIOXIDE, TOTAL: 22
CHLORIDE: 99
CREATININE: 0.88
EGFR IF AFRICN AM: 78
EGFR IF NONAFRICN AM: 68
EOS (ABSOLUTE): 0.3
EOS: 5
GLOBULIN, TOTAL: 2.7
GLUCOSE: 266
HEMATOCRIT: 37.9
HEMATOLOGY COMMENTS:: (no result)
HEMOGLOBIN: 13.1
IMMATURE CELLS: (no result)
IMMATURE GRANS (ABS): 0
IMMATURE GRANULOCYTES: 1
LYMPHS (ABSOLUTE): 2.4
LYMPHS: 36
MCH: 31.6
MCHC: 34.6
MCV: 91
MONOCYTES(ABSOLUTE): 0.6
MONOCYTES: 9
NEUTROPHILS (ABSOLUTE): 3.2
NEUTROPHILS: 48
NRBC: (no result)
PLATELETS: 178
POTASSIUM: 4.7
PROTEIN, TOTAL: 6.9
RBC: 4.15
RDW: 12.8
SEDIMENTATION RATE-WESTERGREN: 8
SODIUM: 138
VITAMIN B12: 1665
VITAMIN D, 25-HYDROXY: 56.1
WBC: 6.6

## 2022-04-20 ENCOUNTER — ERX REFILL RESPONSE (OUTPATIENT)
Dept: URBAN - NONMETROPOLITAN AREA CLINIC 2 | Facility: CLINIC | Age: 72
End: 2022-04-20

## 2022-04-20 RX ORDER — ADALIMUMAB 40MG/0.4ML
INJECT 40 MG UNDER THE SKIN (SUBCUTANEOUS INJECTION) EVERY TWO WEEKS KIT SUBCUTANEOUS
Qty: 2 | Refills: 3 | OUTPATIENT

## 2022-04-20 RX ORDER — ADALIMUMAB 40MG/0.4ML
INJECT 40 MG UNDER THE SKIN (SUBCUTANEOUS INJECTION) EVERY TWO WEEKS KIT SUBCUTANEOUS
Qty: 2 | Refills: 12 | OUTPATIENT

## 2022-05-12 ENCOUNTER — TELEPHONE ENCOUNTER (OUTPATIENT)
Dept: URBAN - METROPOLITAN AREA CLINIC 92 | Facility: CLINIC | Age: 72
End: 2022-05-12

## 2022-05-12 RX ORDER — VITAMIN A 2400 MCG
1 TABLET CAPSULE ORAL TWICE DAILY
Status: ACTIVE | COMMUNITY

## 2022-05-12 RX ORDER — FERROUS SULFATE 325(65) MG
1 TABLET TABLET ORAL BID
Qty: 60 TABLET | Refills: 11 | Status: ACTIVE | COMMUNITY
Start: 2021-01-11

## 2022-05-12 RX ORDER — ADALIMUMAB 40MG/0.4ML
INJECT 40 MG UNDER THE SKIN (SUBCUTANEOUS INJECTION) EVERY TWO WEEKS KIT SUBCUTANEOUS
Qty: 2 | Refills: 12 | Status: ACTIVE | COMMUNITY

## 2022-05-12 RX ORDER — ADALIMUMAB 40MG/0.4ML
1 PEN SUBCUTANEOUS EVERY 2 WEEKS KIT SUBCUTANEOUS EVERY 2 WEEKS
Qty: 2 PRE-FILLED PEN SYRINGE | Refills: 11 | OUTPATIENT
Start: 2022-05-12 | End: 2023-04-13

## 2022-05-12 RX ORDER — SITAGLIPTIN AND METFORMIN HYDROCHLORIDE 50; 1000 MG/1; MG/1
1 TABLET WITH MEALS TABLET, FILM COATED ORAL TWICE A DAY
Status: ACTIVE | COMMUNITY

## 2022-05-12 RX ORDER — LORAZEPAM 0.5 MG/1
1 TABLET TABLET ORAL AS NEEDED
Status: ACTIVE | COMMUNITY

## 2022-05-12 RX ORDER — LAMOTRIGINE 100 MG/1
1 TABLET TABLET ORAL ONCE A DAY
Status: ACTIVE | COMMUNITY

## 2022-05-12 RX ORDER — GABAPENTIN 300 MG/1
1 CAPSULE CAPSULE ORAL TWICE DAILY
Status: ACTIVE | COMMUNITY

## 2022-05-12 RX ORDER — ARIPIPRAZOLE 5 MG/1
1/2 TABLET TABLET ORAL ONCE A DAY
Status: ACTIVE | COMMUNITY

## 2022-05-12 RX ORDER — ADALIMUMAB 40MG/0.8ML
0.8 ML KIT SUBCUTANEOUS EVERY OTHER WEEK
Status: ACTIVE | COMMUNITY

## 2022-05-12 RX ORDER — GLIMEPIRIDE 4 MG/1
1 TABLET WITH BREAKFAST OR THE FIRST MAIN MEAL OF THE DAY TABLET ORAL TWICE DAILY
Status: ACTIVE | COMMUNITY

## 2022-05-12 RX ORDER — LORATADINE 10 MG
1 TABLET TABLET ORAL ONCE A DAY
Status: ACTIVE | COMMUNITY

## 2022-05-12 RX ORDER — BUPROPION HYDROCHLORIDE 300 MG/1
1 TABLET IN THE MORNING TABLET, FILM COATED, EXTENDED RELEASE ORAL ONCE A DAY
Status: ACTIVE | COMMUNITY

## 2022-05-12 RX ORDER — CYANOCOBALAMIN 1000 UG/ML
1 ML INJECTION INTRAMUSCULAR; SUBCUTANEOUS
Status: ACTIVE | COMMUNITY

## 2022-05-17 ENCOUNTER — OFFICE VISIT (OUTPATIENT)
Dept: URBAN - NONMETROPOLITAN AREA CLINIC 2 | Facility: CLINIC | Age: 72
End: 2022-05-17
Payer: MEDICARE

## 2022-05-17 VITALS
HEIGHT: 56 IN | HEART RATE: 68 BPM | BODY MASS INDEX: 45.44 KG/M2 | DIASTOLIC BLOOD PRESSURE: 70 MMHG | SYSTOLIC BLOOD PRESSURE: 122 MMHG | TEMPERATURE: 97.5 F | WEIGHT: 202 LBS

## 2022-05-17 DIAGNOSIS — Z12.11 COLON CANCER SCREENING: ICD-10-CM

## 2022-05-17 DIAGNOSIS — K50.10 CROHN'S DISEASE OF COLON WITHOUT COMPLICATION: ICD-10-CM

## 2022-05-17 PROCEDURE — 99214 OFFICE O/P EST MOD 30 MIN: CPT | Performed by: NURSE PRACTITIONER

## 2022-05-17 RX ORDER — ADALIMUMAB 40MG/0.4ML
1 PEN SUBCUTANEOUS EVERY 2 WEEKS KIT SUBCUTANEOUS EVERY 2 WEEKS
Qty: 2 PRE-FILLED PEN SYRINGE | Refills: 11 | Status: ACTIVE | COMMUNITY
Start: 2022-05-12 | End: 2023-04-13

## 2022-05-17 RX ORDER — VITAMIN A 2400 MCG
1 TABLET CAPSULE ORAL TWICE DAILY
Status: ON HOLD | COMMUNITY

## 2022-05-17 RX ORDER — LORATADINE 10 MG
1 TABLET TABLET ORAL ONCE A DAY
Status: ON HOLD | COMMUNITY

## 2022-05-17 RX ORDER — ADALIMUMAB 40MG/0.8ML
0.8 ML KIT SUBCUTANEOUS EVERY OTHER WEEK
Status: ACTIVE | COMMUNITY

## 2022-05-17 RX ORDER — GABAPENTIN 300 MG/1
1 CAPSULE CAPSULE ORAL TWICE DAILY
Status: ACTIVE | COMMUNITY

## 2022-05-17 RX ORDER — SITAGLIPTIN AND METFORMIN HYDROCHLORIDE 50; 1000 MG/1; MG/1
1 TABLET WITH MEALS TABLET, FILM COATED ORAL TWICE A DAY
Status: ACTIVE | COMMUNITY

## 2022-05-17 RX ORDER — BUPROPION HYDROCHLORIDE 300 MG/1
1 TABLET IN THE MORNING TABLET, FILM COATED, EXTENDED RELEASE ORAL ONCE A DAY
Status: ACTIVE | COMMUNITY

## 2022-05-17 RX ORDER — ADALIMUMAB 40MG/0.4ML
INJECT 40 MG UNDER THE SKIN (SUBCUTANEOUS INJECTION) EVERY TWO WEEKS KIT SUBCUTANEOUS
Qty: 2 | Refills: 12 | Status: ACTIVE | COMMUNITY

## 2022-05-17 RX ORDER — GLIMEPIRIDE 4 MG/1
1 TABLET WITH BREAKFAST OR THE FIRST MAIN MEAL OF THE DAY TABLET ORAL TWICE DAILY
Status: ACTIVE | COMMUNITY

## 2022-05-17 RX ORDER — FERROUS SULFATE 325(65) MG
1 TABLET TABLET ORAL BID
Qty: 60 TABLET | Refills: 11 | Status: ON HOLD | COMMUNITY
Start: 2021-01-11

## 2022-05-17 RX ORDER — ARIPIPRAZOLE 5 MG/1
1/2 TABLET TABLET ORAL ONCE A DAY
Status: ACTIVE | COMMUNITY

## 2022-05-17 RX ORDER — LAMOTRIGINE 100 MG/1
1 TABLET TABLET ORAL ONCE A DAY
Status: ACTIVE | COMMUNITY

## 2022-05-17 RX ORDER — CYANOCOBALAMIN 1000 UG/ML
1 ML INJECTION INTRAMUSCULAR; SUBCUTANEOUS
Status: ACTIVE | COMMUNITY

## 2022-05-17 RX ORDER — LORAZEPAM 0.5 MG/1
1 TABLET TABLET ORAL AS NEEDED
Status: ACTIVE | COMMUNITY

## 2022-05-17 NOTE — HPI-TODAY'S VISIT:
Marybeth presents for evaluation of Crohn's colitis.  She was originally diagnosed in 2018 by Dr. Robbin Mix per her report, she states that she developed acute diarrhea and was told that she had Crohn's colitis.  She was never treated with any remission therapy, she thinks that she took steroids.  She had no issues over the past 2 years but recently has had increased tenesmus and urgency with incontinence.  She had a repeat colonoscopy in June by Dr. Robbin Mix with active Crohn's colitis.  He recommended Humira which she started 2 weeks ago.  She has taken her first 2 induction doses, she is due for maintenance.  She is switching providers given issues with communication with Dr. Mix's office.  She states that she has a bowel movement most days of the week with urgency and tenesmus, at times she has 2-3 urgent bowel movements in a day, however she does have episodes of incomplete evacuation in between these periods.  She denies any mucus or bleeding.  We do not have a copy of her records from Dr. Mix.  She has not had any blood work recently.  Today she is here to establish care, and she is otherwise doing well.  MB   10/19/2020 Marbyeth presents for follow-up of Crohn's colitis.  Since her last visit we did get Dr. Mix's office notes but have not received his colonoscopy or pathology results.  She is doing well on Humira every other week with no complaints regarding her injection.  Now her bowels are more alternating, she will have no bowel movement for 2 to 3 days, passed a large hard stool and then have some loose stool after this.  The cycle will start over following.  She did meet with Sue and is trying to follow an anti-inflammatory diet.  She denies any mucus or bleeding associated with the symptoms.  She has not had any blood work since her last visit, today she is doing well otherwise.  MB   1/11/2021  Marybeth presents for followup of Crohn's colitis. She is doing well today on Humira every other week. She has no significant diarrhea so long as she maintains a low fiber diet. She is due for lab work. She is moving into [place] and will need a paper rx for some prescriptions. Today she is doing well otherwise. MB  7/12/2021 Mrs. Cho presents for follow-up of Crohn's disease.  Since her last visit she has been doing well in regard to her disease.  She is on Colace daily with no symptoms of flare.  She denies any abdominal pain or rectal bleeding.  She has had some mild local reactions to her injections with some swelling.  She has no systemic complaints.  She is in the [place] study locally at [facility] following with her Covid antibodies.  She now has low antibody levels.  She has been recommended for an immunologic work-up per the [place] team.  Her last colonoscopy was done in 2019 by Dr. Robbin Mix with Crohn's colitis.  We still do not have the actual report but we do have his office notes with moderate active disease.  Today she agrees to repeat colonoscopy for surveillance, she does agree to Humira serologies to ensure no antibody development given local reactions, and we have discussed referral to allergy and immunology for evaluation of poor response to the Covid vaccine.  MB 11/30/2021 Marybeth presents for colonoscopy follow-up.  Her labs show no Humira antibodies, she does have low normal serum drug level.  Her colonoscopy reveals 3 inflammatory polyps, right-sided inflammation on colonoscopy, normal terminal ileum.  Her biopsy showed active inflammation in the left and right colon.  She is having 1-2 bowel movements daily.  These are soft at times however she has been on Colace twice daily.  Today we discussed decreasing that.  She denies any urgency, mucus or bleeding.  We did discuss her Humira serologies and that given her low normal serum drug level and active inflammation we could consider going to weekly dosing.  For now she wants to continue her biweekly dosing and continue to monitor her symptoms.  She agrees to try you serous and increase her Humira to weekly if she flares.  MB 5/17/2022 Marybeth presents for follow-up of Crohn's disease.  Since her last visit she is actually been doing very well.  She is taking your Humira injection every other week.  She is had no flares.  She is having 1-2 soft bowel movement daily.  She is up-to-date on her skin cancer screening.  She is now following with Dr. Rtana bloom and will discuss a Pap smear with her.  She is due for repeat labs today.  Otherwise she has no new GI complaints.  She would be due for repeat surveillance colonoscopy next year.  MB

## 2022-05-17 NOTE — PHYSICAL EXAM ORAL CAVITY:
mucosa moist , no lesions M-Plasty Complex Repair Preamble Text (Leave Blank If You Do Not Want): Extensive wide undermining was performed.

## 2022-07-21 LAB
A/G RATIO: 1.4
ABSOLUTE BASOPHILS: 48
ABSOLUTE EOSINOPHILS: 122
ABSOLUTE LYMPHOCYTES: 2468
ABSOLUTE MONOCYTES: 564
ABSOLUTE NEUTROPHILS: 3597
ALBUMIN: 4.3
ALKALINE PHOSPHATASE: 52
ALT (SGPT): 18
AST (SGOT): 20
BASOPHILS: 0.7
BILIRUBIN, TOTAL: 0.5
BUN/CREATININE RATIO: (no result)
BUN: 16
C-REACTIVE PROTEIN, QUANT: 0.8
CALCIUM: 9.8
CARBON DIOXIDE, TOTAL: 26
CHLORIDE: 99
CREATININE: 0.97
EGFR: 63
EOSINOPHILS: 1.8
GLOBULIN, TOTAL: 3
GLUCOSE: 272
HEMATOCRIT: 39.9
HEMOGLOBIN: 14.2
LYMPHOCYTES: 36.3
MCH: 32.3
MCHC: 35.6
MCV: 90.7
MONOCYTES: 8.3
MPV: 9.8
NEUTROPHILS: 52.9
PLATELET COUNT: 146
POTASSIUM: 4.6
PROTEIN, TOTAL: 7.3
RDW: 13
RED BLOOD CELL COUNT: 4.4
SED RATE BY MODIFIED: 2
SODIUM: 135
VITAMIN B12: 386
WHITE BLOOD CELL COUNT: 6.8

## 2022-09-30 ENCOUNTER — TELEPHONE ENCOUNTER (OUTPATIENT)
Dept: URBAN - METROPOLITAN AREA CLINIC 92 | Facility: CLINIC | Age: 72
End: 2022-09-30

## 2022-09-30 RX ORDER — ADALIMUMAB 40MG/0.4ML
AS DIRECTED KIT SUBCUTANEOUS EVERY 2 WEEKS
Qty: 2 PEN NEEDLE | Refills: 11

## 2022-09-30 RX ORDER — ADALIMUMAB 40MG/0.4ML
1 PEN SUBCUTANEOUS EVERY 2 WEEKS KIT SUBCUTANEOUS EVERY 2 WEEKS
Qty: 2 PRE-FILLED PEN SYRINGE | Refills: 11
Start: 2022-05-12 | End: 2023-09-05

## 2022-10-04 ENCOUNTER — TELEPHONE ENCOUNTER (OUTPATIENT)
Dept: URBAN - METROPOLITAN AREA CLINIC 92 | Facility: CLINIC | Age: 72
End: 2022-10-04

## 2022-10-04 RX ORDER — ADALIMUMAB 40MG/0.4ML
AS DIRECTED KIT SUBCUTANEOUS EVERY 2 WEEKS
Qty: 2 PEN NEEDLE | Refills: 11
End: 2023-09-05

## 2022-11-15 ENCOUNTER — TELEPHONE ENCOUNTER (OUTPATIENT)
Dept: URBAN - NONMETROPOLITAN AREA CLINIC 13 | Facility: CLINIC | Age: 72
End: 2022-11-15

## 2022-11-17 ENCOUNTER — OFFICE VISIT (OUTPATIENT)
Dept: URBAN - NONMETROPOLITAN AREA CLINIC 2 | Facility: CLINIC | Age: 72
End: 2022-11-17

## 2022-12-06 ENCOUNTER — TELEPHONE ENCOUNTER (OUTPATIENT)
Dept: URBAN - NONMETROPOLITAN AREA CLINIC 2 | Facility: CLINIC | Age: 72
End: 2022-12-06

## 2022-12-06 RX ORDER — ADALIMUMAB 40MG/0.4ML
AS DIRECTED KIT SUBCUTANEOUS EVERY 2 WEEKS
OUTPATIENT
End: 2023-09-05

## 2022-12-06 RX ORDER — ADALIMUMAB 40MG/0.8ML
0.8 ML KIT SUBCUTANEOUS EVERY OTHER WEEK
OUTPATIENT

## 2022-12-06 RX ORDER — ADALIMUMAB 40MG/0.4ML
1 PEN SUBCUTANEOUS EVERY 2 WEEKS KIT SUBCUTANEOUS EVERY 2 WEEKS
OUTPATIENT
Start: 2022-05-12 | End: 2023-09-05

## 2022-12-06 RX ORDER — ADALIMUMAB 40MG/0.4ML
1 PEN SUBCUTANEUS EVERY 2 WEEKS KIT SUBCUTANEOUS EVERY 2 WEEKS
Qty: 6 PEN NEEDLE | Refills: 3 | OUTPATIENT
Start: 2022-12-07 | End: 2023-12-02

## 2022-12-22 ENCOUNTER — TELEPHONE ENCOUNTER (OUTPATIENT)
Dept: URBAN - NONMETROPOLITAN AREA CLINIC 2 | Facility: CLINIC | Age: 72
End: 2022-12-22

## 2023-01-25 ENCOUNTER — OFFICE VISIT (OUTPATIENT)
Dept: URBAN - NONMETROPOLITAN AREA CLINIC 13 | Facility: CLINIC | Age: 73
End: 2023-01-25
Payer: MEDICARE

## 2023-01-25 VITALS
WEIGHT: 190.4 LBS | BODY MASS INDEX: 42.83 KG/M2 | DIASTOLIC BLOOD PRESSURE: 72 MMHG | TEMPERATURE: 98.1 F | HEIGHT: 56 IN | SYSTOLIC BLOOD PRESSURE: 148 MMHG | HEART RATE: 72 BPM

## 2023-01-25 DIAGNOSIS — K50.10 CROHN'S DISEASE OF COLON WITHOUT COMPLICATION: ICD-10-CM

## 2023-01-25 DIAGNOSIS — Z12.11 COLON CANCER SCREENING: ICD-10-CM

## 2023-01-25 PROCEDURE — 99214 OFFICE O/P EST MOD 30 MIN: CPT | Performed by: NURSE PRACTITIONER

## 2023-01-25 RX ORDER — BUPROPION HYDROCHLORIDE 300 MG/1
1 TABLET IN THE MORNING TABLET, FILM COATED, EXTENDED RELEASE ORAL ONCE A DAY
Status: ACTIVE | COMMUNITY

## 2023-01-25 RX ORDER — LORATADINE 10 MG
1 TABLET TABLET ORAL ONCE A DAY
Status: ON HOLD | COMMUNITY

## 2023-01-25 RX ORDER — VITAMIN A 2400 MCG
1 TABLET CAPSULE ORAL TWICE DAILY
Status: ON HOLD | COMMUNITY

## 2023-01-25 RX ORDER — FERROUS SULFATE 325(65) MG
1 TABLET TABLET ORAL BID
Qty: 60 TABLET | Refills: 11 | Status: ON HOLD | COMMUNITY
Start: 2021-01-11

## 2023-01-25 RX ORDER — ARIPIPRAZOLE 5 MG/1
1/2 TABLET TABLET ORAL ONCE A DAY
Status: ACTIVE | COMMUNITY

## 2023-01-25 RX ORDER — GLIMEPIRIDE 4 MG/1
1 TABLET WITH BREAKFAST OR THE FIRST MAIN MEAL OF THE DAY TABLET ORAL TWICE DAILY
Status: ACTIVE | COMMUNITY

## 2023-01-25 RX ORDER — ADALIMUMAB 40MG/0.4ML
1 PEN SUBCUTANEUS EVERY 2 WEEKS KIT SUBCUTANEOUS EVERY 2 WEEKS
Qty: 6 PEN NEEDLE | Refills: 3 | Status: ACTIVE | COMMUNITY
Start: 2022-12-07 | End: 2023-12-02

## 2023-01-25 RX ORDER — LORAZEPAM 0.5 MG/1
1 TABLET TABLET ORAL AS NEEDED
Status: ACTIVE | COMMUNITY

## 2023-01-25 RX ORDER — GABAPENTIN 300 MG/1
1 CAPSULE CAPSULE ORAL TWICE DAILY
Status: ACTIVE | COMMUNITY

## 2023-01-25 RX ORDER — CYANOCOBALAMIN 1000 UG/ML
1 ML INJECTION INTRAMUSCULAR; SUBCUTANEOUS
Status: ACTIVE | COMMUNITY

## 2023-01-25 RX ORDER — SITAGLIPTIN AND METFORMIN HYDROCHLORIDE 50; 1000 MG/1; MG/1
1 TABLET WITH MEALS TABLET, FILM COATED ORAL TWICE A DAY
Status: ACTIVE | COMMUNITY

## 2023-01-25 RX ORDER — LAMOTRIGINE 100 MG/1
1 TABLET TABLET ORAL ONCE A DAY
Status: ACTIVE | COMMUNITY

## 2023-01-25 NOTE — HPI-TODAY'S VISIT:
Marybeth presents for evaluation of Crohn's colitis.  She was originally diagnosed in 2018 by Dr. Robbin Mix per her report, she states that she developed acute diarrhea and was told that she had Crohn's colitis.  She was never treated with any remission therapy, she thinks that she took steroids.  She had no issues over the past 2 years but recently has had increased tenesmus and urgency with incontinence.  She had a repeat colonoscopy in June by Dr. Robbin Mix with active Crohn's colitis.  He recommended Humira which she started 2 weeks ago.  She has taken her first 2 induction doses, she is due for maintenance.  She is switching providers given issues with communication with Dr. Mix's office.  She states that she has a bowel movement most days of the week with urgency and tenesmus, at times she has 2-3 urgent bowel movements in a day, however she does have episodes of incomplete evacuation in between these periods.  She denies any mucus or bleeding.  We do not have a copy of her records from Dr. Mix.  She has not had any blood work recently.  Today she is here to establish care, and she is otherwise doing well.  MB   10/19/2020 Marybeth presents for follow-up of Crohn's colitis.  Since her last visit we did get Dr. Mix's office notes but have not received his colonoscopy or pathology results.  She is doing well on Humira every other week with no complaints regarding her injection.  Now her bowels are more alternating, she will have no bowel movement for 2 to 3 days, passed a large hard stool and then have some loose stool after this.  The cycle will start over following.  She did meet with Sue and is trying to follow an anti-inflammatory diet.  She denies any mucus or bleeding associated with the symptoms.  She has not had any blood work since her last visit, today she is doing well otherwise.  MB   1/11/2021  Marybeth presents for followup of Crohn's colitis. She is doing well today on Humira every other week. She has no significant diarrhea so long as she maintains a low fiber diet. She is due for lab work. She is moving into [place] and will need a paper rx for some prescriptions. Today she is doing well otherwise. MB  7/12/2021 Mrs. Cho presents for follow-up of Crohn's disease.  Since her last visit she has been doing well in regard to her disease.  She is on Colace daily with no symptoms of flare.  She denies any abdominal pain or rectal bleeding.  She has had some mild local reactions to her injections with some swelling.  She has no systemic complaints.  She is in the [place] study locally at [facility] following with her Covid antibodies.  She now has low antibody levels.  She has been recommended for an immunologic work-up per the [place] team.  Her last colonoscopy was done in 2019 by Dr. Robbin Mix with Crohn's colitis.  We still do not have the actual report but we do have his office notes with moderate active disease.  Today she agrees to repeat colonoscopy for surveillance, she does agree to Humira serologies to ensure no antibody development given local reactions, and we have discussed referral to allergy and immunology for evaluation of poor response to the Covid vaccine.  MB 11/30/2021 Marybeth presents for colonoscopy follow-up.  Her labs show no Humira antibodies, she does have low normal serum drug level.  Her colonoscopy reveals 3 inflammatory polyps, right-sided inflammation on colonoscopy, normal terminal ileum.  Her biopsy showed active inflammation in the left and right colon.  She is having 1-2 bowel movements daily.  These are soft at times however she has been on Colace twice daily.  Today we discussed decreasing that.  She denies any urgency, mucus or bleeding.  We did discuss her Humira serologies and that given her low normal serum drug level and active inflammation we could consider going to weekly dosing.  For now she wants to continue her biweekly dosing and continue to monitor her symptoms.  She agrees to try you serous and increase her Humira to weekly if she flares.  MB 5/17/2022 Marybeth presents for follow-up of Crohn's disease.  Since her last visit she is actually been doing very well.  She is taking your Humira injection every other week.  She is had no flares.  She is having 1-2 soft bowel movement daily.  She is up-to-date on her skin cancer screening.  She is now following with Dr. Ratna bloom and will discuss a Pap smear with her.  She is due for repeat labs today.  Otherwise she has no new GI complaints.  She would be due for repeat surveillance colonoscopy next year.  MB 1/25/2023 Marybeth presents for follow-up of Crohn's disease.  She is doing well Humira 40 mg every 2 weeks.  She denies any flares.  She has 1-2 soft bowel movements daily with no bleeding or abdominal pain.  Today we have discussed Humira long-term.  She is interested in discontinuing therapy.  We have discussed risks and benefits of Humira along with unmanaged Crohn's disease.  She is due for repeat surveillance colonoscopy this October.  We will follow-up depending on her endoscopic findings.  Today she is doing well otherwise and due for repeat lab work.  MB

## 2023-02-13 ENCOUNTER — TELEPHONE ENCOUNTER (OUTPATIENT)
Dept: URBAN - NONMETROPOLITAN AREA CLINIC 2 | Facility: CLINIC | Age: 73
End: 2023-02-13

## 2023-07-12 ENCOUNTER — OFFICE VISIT (OUTPATIENT)
Dept: URBAN - NONMETROPOLITAN AREA CLINIC 13 | Facility: CLINIC | Age: 73
End: 2023-07-12

## 2023-08-15 ENCOUNTER — TELEPHONE ENCOUNTER (OUTPATIENT)
Dept: URBAN - NONMETROPOLITAN AREA CLINIC 2 | Facility: CLINIC | Age: 73
End: 2023-08-15

## 2023-08-16 ENCOUNTER — OFFICE VISIT (OUTPATIENT)
Dept: URBAN - METROPOLITAN AREA TELEHEALTH 2 | Facility: TELEHEALTH | Age: 73
End: 2023-08-16
Payer: MEDICARE

## 2023-08-16 DIAGNOSIS — R43.2 DYSGEUSIA: ICD-10-CM

## 2023-08-16 DIAGNOSIS — Z12.11 COLON CANCER SCREENING: ICD-10-CM

## 2023-08-16 DIAGNOSIS — K59.04 CHRONIC IDIOPATHIC CONSTIPATION: ICD-10-CM

## 2023-08-16 DIAGNOSIS — K50.10 CROHN'S DISEASE OF COLON WITHOUT COMPLICATION: ICD-10-CM

## 2023-08-16 PROCEDURE — 99214 OFFICE O/P EST MOD 30 MIN: CPT | Performed by: NURSE PRACTITIONER

## 2023-08-16 RX ORDER — DICYCLOMINE HYDROCHLORIDE 20 MG/1
TABLET ORAL
Qty: 10 TABLET | Status: ACTIVE | COMMUNITY

## 2023-08-16 RX ORDER — FERROUS SULFATE 325(65) MG
1 TABLET TABLET ORAL BID
Qty: 60 TABLET | Refills: 11 | Status: ON HOLD | COMMUNITY
Start: 2021-01-11

## 2023-08-16 RX ORDER — CYANOCOBALAMIN 1000 UG/ML
1 ML INJECTION INTRAMUSCULAR; SUBCUTANEOUS
Status: ACTIVE | COMMUNITY

## 2023-08-16 RX ORDER — ACETAMINOPHEN AND CODEINE PHOSPHATE 300; 30 MG/1; MG/1
TABLET ORAL
Qty: 24 TABLET | Status: ON HOLD | COMMUNITY

## 2023-08-16 RX ORDER — SEMAGLUTIDE 0.68 MG/ML
INJECTION, SOLUTION SUBCUTANEOUS
Qty: 3 MILLILITER | Status: ACTIVE | COMMUNITY

## 2023-08-16 RX ORDER — ATORVASTATIN CALCIUM, FILM COATED 40 MG/1
TAKE 1 TABLET BY MOUTH EVERYDAY AT BEDTIME TABLET ORAL
Qty: 90 EACH | Refills: 1 | Status: ACTIVE | COMMUNITY

## 2023-08-16 RX ORDER — ARIPIPRAZOLE 5 MG/1
1/2 TABLET TABLET ORAL ONCE A DAY
Status: ON HOLD | COMMUNITY

## 2023-08-16 RX ORDER — ADALIMUMAB 40MG/0.4ML
KIT SUBCUTANEOUS
Qty: 2 EACH | Status: ACTIVE | COMMUNITY

## 2023-08-16 RX ORDER — GABAPENTIN 300 MG/1
1 CAPSULE CAPSULE ORAL TWICE DAILY
Status: ON HOLD | COMMUNITY

## 2023-08-16 RX ORDER — LORATADINE 10 MG
1 TABLET TABLET ORAL ONCE A DAY
Status: ON HOLD | COMMUNITY

## 2023-08-16 RX ORDER — VITAMIN A 2400 MCG
1 TABLET CAPSULE ORAL TWICE DAILY
Status: ON HOLD | COMMUNITY

## 2023-08-16 RX ORDER — SUCRALFATE 1 G/1
TABLET ORAL
Qty: 15 TABLET | Status: ACTIVE | COMMUNITY

## 2023-08-16 RX ORDER — METOPROLOL SUCCINATE 50 MG/1
TABLET, EXTENDED RELEASE ORAL
Qty: 90 TABLET | Status: ACTIVE | COMMUNITY

## 2023-08-16 RX ORDER — LORAZEPAM 0.5 MG/1
1 TABLET TABLET ORAL AS NEEDED
Status: ACTIVE | COMMUNITY

## 2023-08-16 RX ORDER — BUPROPION HYDROCHLORIDE 300 MG/1
1 TABLET IN THE MORNING TABLET, FILM COATED, EXTENDED RELEASE ORAL ONCE A DAY
Status: ACTIVE | COMMUNITY

## 2023-08-16 RX ORDER — ADALIMUMAB 40MG/0.4ML
1 PEN SUBCUTANEUS EVERY 2 WEEKS KIT SUBCUTANEOUS EVERY 2 WEEKS
Qty: 6 PEN NEEDLE | Refills: 3 | Status: ACTIVE | COMMUNITY
Start: 2022-12-07 | End: 2023-12-02

## 2023-08-16 RX ORDER — LAMOTRIGINE 100 MG/1
1 TABLET TABLET ORAL ONCE A DAY
Status: ACTIVE | COMMUNITY

## 2023-08-16 RX ORDER — SITAGLIPTIN AND METFORMIN HYDROCHLORIDE 50; 1000 MG/1; MG/1
1 TABLET WITH MEALS TABLET, FILM COATED ORAL TWICE A DAY
Status: ACTIVE | COMMUNITY

## 2023-08-16 RX ORDER — LIDOCAINE 50 MG/G
1 PATCH APPLIED TOPICALLY FOR 12 HRS ONCE A DAY AS NEEDED FOR PAIN 30 DAY(S) PATCH CUTANEOUS
Qty: 30 EACH | Refills: 0 | Status: ACTIVE | COMMUNITY

## 2023-08-16 RX ORDER — GLIMEPIRIDE 4 MG/1
1 TABLET WITH BREAKFAST OR THE FIRST MAIN MEAL OF THE DAY TABLET ORAL TWICE DAILY
Status: ACTIVE | COMMUNITY

## 2023-08-16 RX ORDER — TRAZODONE HYDROCHLORIDE 50 MG/1
TAKE 1/2 TABLET BY MOUTH AT BEDTIME TABLET ORAL
Qty: 15 EACH | Refills: 0 | Status: ACTIVE | COMMUNITY

## 2023-08-16 NOTE — HPI-TODAY'S VISIT:
Marybeth presents for evaluation of Crohn's colitis.  She was originally diagnosed in 2018 by Dr. Robbin Mix per her report, she states that she developed acute diarrhea and was told that she had Crohn's colitis.  She was never treated with any remission therapy, she thinks that she took steroids.  She had no issues over the past 2 years but recently has had increased tenesmus and urgency with incontinence.  She had a repeat colonoscopy in June by Dr. Robbin Mix with active Crohn's colitis.  He recommended Humira which she started 2 weeks ago.  She has taken her first 2 induction doses, she is due for maintenance.  She is switching providers given issues with communication with Dr. Mix's office.  She states that she has a bowel movement most days of the week with urgency and tenesmus, at times she has 2-3 urgent bowel movements in a day, however she does have episodes of incomplete evacuation in between these periods.  She denies any mucus or bleeding.  We do not have a copy of her records from Dr. Mix.  She has not had any blood work recently.  Today she is here to establish care, and she is otherwise doing well.  MB   10/19/2020 Marybeth presents for follow-up of Crohn's colitis.  Since her last visit we did get Dr. Mix's office notes but have not received his colonoscopy or pathology results.  She is doing well on Humira every other week with no complaints regarding her injection.  Now her bowels are more alternating, she will have no bowel movement for 2 to 3 days, passed a large hard stool and then have some loose stool after this.  The cycle will start over following.  She did meet with Sue and is trying to follow an anti-inflammatory diet.  She denies any mucus or bleeding associated with the symptoms.  She has not had any blood work since her last visit, today she is doing well otherwise.  MB   1/11/2021  Marybeth presents for followup of Crohn's colitis. She is doing well today on Humira every other week. She has no significant diarrhea so long as she maintains a low fiber diet. She is due for lab work. She is moving into [facility] and will need a paper rx for some prescriptions. Today she is doing well otherwise. MB  7/12/2021 Mrs. Cho presents for follow-up of Crohn's disease.  Since her last visit she has been doing well in regard to her disease.  She is on Colace daily with no symptoms of flare.  She denies any abdominal pain or rectal bleeding.  She has had some mild local reactions to her injections with some swelling.  She has no systemic complaints.  She is in the [place] study locally at [facility] following with her Covid antibodies.  She now has low antibody levels.  She has been recommended for an immunologic work-up per the [place] team.  Her last colonoscopy was done in 2019 by Dr. Robbin Mix with Crohn's colitis.  We still do not have the actual report but we do have his office notes with moderate active disease.  Today she agrees to repeat colonoscopy for surveillance, she does agree to Humira serologies to ensure no antibody development given local reactions, and we have discussed referral to allergy and immunology for evaluation of poor response to the Covid vaccine.  MB 11/30/2021 Marybeth presents for colonoscopy follow-up.  Her labs show no Humira antibodies, she does have low normal serum drug level.  Her colonoscopy reveals 3 inflammatory polyps, right-sided inflammation on colonoscopy, normal terminal ileum.  Her biopsy showed active inflammation in the left and right colon.  She is having 1-2 bowel movements daily.  These are soft at times however she has been on Colace twice daily.  Today we discussed decreasing that.  She denies any urgency, mucus or bleeding.  We did discuss her Humira serologies and that given her low normal serum drug level and active inflammation we could consider going to weekly dosing.  For now she wants to continue her biweekly dosing and continue to monitor her symptoms.  She agrees to try you serous and increase her Humira to weekly if she flares.  MB 5/17/2022 Marybeth presents for follow-up of Crohn's disease.  Since her last visit she is actually been doing very well.  She is taking your Humira injection every other week.  She is had no flares.  She is having 1-2 soft bowel movement daily.  She is up-to-date on her skin cancer screening.  She is now following with Dr. Ratna bloom and will discuss a Pap smear with her.  She is due for repeat labs today.  Otherwise she has no new GI complaints.  She would be due for repeat surveillance colonoscopy next year.  MB 1/25/2023 Marybeth presents for follow-up of Crohn's disease.  She is doing well Humira 40 mg every 2 weeks.  She denies any flares.  She has 1-2 soft bowel movements daily with no bleeding or abdominal pain.  Today we have discussed Humira long-term.  She is interested in discontinuing therapy.  We have discussed risks and benefits of Humira along with unmanaged Crohn's disease.  She is due for repeat surveillance colonoscopy this October.  We will follow-up depending on her endoscopic findings.  Today she is doing well otherwise and due for repeat lab work.  MB 8/16/2023 Marybeth presents for follow-up of Crohn's disease.  Since her last visit she underwent coronary artery bypass graft by Dr. Inman in February.  She was told to hold her Humira 4 weeks before and 4 weeks after but she never restarted.  Postoperatively she developed constipation, she is been taking stool softeners with persistent constipation.  She denies any bleeding or diarrhea.  She recently started Ozempic and developed acute left-sided abdominal pain.  She does agree to hold this for now.  We have discussed restarting Humira versus an alternative biologic with her heart disease.  Today Sherrie check her labs along with her Humira serologies.  We will consider reinduction with Humira versus colonoscopy and alternative biologic therapy pending her results.  Today she is actually doing fairly well in regard to her GI complaints.  She is doing well in regard to her heart disease, she had an uncomplicated postop course.  MB

## 2023-08-21 ENCOUNTER — TELEPHONE ENCOUNTER (OUTPATIENT)
Dept: URBAN - NONMETROPOLITAN AREA CLINIC 13 | Facility: CLINIC | Age: 73
End: 2023-08-21

## 2023-08-22 ENCOUNTER — TELEPHONE ENCOUNTER (OUTPATIENT)
Dept: URBAN - NONMETROPOLITAN AREA CLINIC 2 | Facility: CLINIC | Age: 73
End: 2023-08-22

## 2023-08-26 LAB
A/G RATIO: 1.5
ABSOLUTE BASOPHILS: 90
ABSOLUTE EOSINOPHILS: 242
ABSOLUTE LYMPHOCYTES: 1787
ABSOLUTE MONOCYTES: 635
ABSOLUTE NEUTROPHILS: 4147
ADALIMUMAB AB, IBD: 63
ADALIMUMAB AB, IBD: 63
ADALIMUMAB LEVEL, IBD: <0.8
ALBUMIN: 3.9
ALKALINE PHOSPHATASE: 71
ALT (SGPT): 15
AST (SGOT): 16
BASOPHILS: 1.3
BILIRUBIN, TOTAL: 0.5
BUN/CREATININE RATIO: (no result)
BUN: 11
C-REACTIVE PROTEIN, QUANT: 4.7
CALCIUM: 9.7
CARBON DIOXIDE, TOTAL: 25
CHLORIDE: 99
COMMENT: (no result)
CREATININE: 0.89
EGFR: 70
EOSINOPHILS: 3.5
FOLATE (FOLIC ACID), SERUM: >24
GLOBULIN, TOTAL: 2.6
GLUCOSE: 206
HEMATOCRIT: 39.8
HEMOGLOBIN: 12.8
HEPATITIS B CORE AB TOTAL: (no result)
HEPATITIS B SURFACE ANTIGEN: (no result)
INTERPRETATION: (no result)
LYMPHOCYTES: 25.9
MCH: 30.3
MCHC: 32.2
MCV: 94.1
MITOGEN-NIL: 7.74
MONOCYTES: 9.2
MPV: 8.7
NEUTROPHILS: 60.1
PLATELET COUNT: 205
POTASSIUM: 4.3
PROTEIN, TOTAL: 6.5
QUANTIFERON NIL VALUE: 0.02
QUANTIFERON TB1 AG VALUE: 0
QUANTIFERON TB2 AG VALUE: 0
QUANTIFERON-TB GOLD PLUS: NEGATIVE
RDW: 13
RED BLOOD CELL COUNT: 4.23
SED RATE BY MODIFIED: 2
SODIUM: 135
VITAMIN B12: 506
WHITE BLOOD CELL COUNT: 6.9

## 2023-08-29 ENCOUNTER — TELEPHONE ENCOUNTER (OUTPATIENT)
Dept: URBAN - NONMETROPOLITAN AREA CLINIC 2 | Facility: CLINIC | Age: 73
End: 2023-08-29

## 2023-08-30 ENCOUNTER — TELEPHONE ENCOUNTER (OUTPATIENT)
Dept: URBAN - NONMETROPOLITAN AREA CLINIC 2 | Facility: CLINIC | Age: 73
End: 2023-08-30

## 2023-08-30 ENCOUNTER — LAB OUTSIDE AN ENCOUNTER (OUTPATIENT)
Dept: URBAN - NONMETROPOLITAN AREA CLINIC 2 | Facility: CLINIC | Age: 73
End: 2023-08-30

## 2023-08-30 VITALS — HEIGHT: 56 IN

## 2023-08-30 RX ORDER — GLIMEPIRIDE 4 MG/1
1 TABLET WITH BREAKFAST OR THE FIRST MAIN MEAL OF THE DAY TABLET ORAL TWICE DAILY
Status: ACTIVE | COMMUNITY

## 2023-08-30 RX ORDER — LORAZEPAM 0.5 MG/1
1 TABLET TABLET ORAL AS NEEDED
Status: ACTIVE | COMMUNITY

## 2023-08-30 RX ORDER — FERROUS SULFATE 325(65) MG
1 TABLET TABLET ORAL BID
Qty: 60 TABLET | Refills: 11 | COMMUNITY
Start: 2021-01-11

## 2023-08-30 RX ORDER — SUCRALFATE 1 G/1
TABLET ORAL
Qty: 15 TABLET | COMMUNITY

## 2023-08-30 RX ORDER — TRAZODONE HYDROCHLORIDE 50 MG/1
TAKE 1/2 TABLET BY MOUTH AT BEDTIME TABLET ORAL
Qty: 15 EACH | Refills: 0 | COMMUNITY

## 2023-08-30 RX ORDER — ACETAMINOPHEN AND CODEINE PHOSPHATE 300; 30 MG/1; MG/1
TABLET ORAL
Qty: 24 TABLET | COMMUNITY

## 2023-08-30 RX ORDER — GABAPENTIN 300 MG/1
1 CAPSULE CAPSULE ORAL TWICE DAILY
COMMUNITY

## 2023-08-30 RX ORDER — LAMOTRIGINE 100 MG/1
1 TABLET TABLET ORAL ONCE A DAY
Status: ACTIVE | COMMUNITY

## 2023-08-30 RX ORDER — ADALIMUMAB 40MG/0.4ML
1 PEN SUBCUTANEUS EVERY 2 WEEKS KIT SUBCUTANEOUS EVERY 2 WEEKS
Qty: 6 PEN NEEDLE | Refills: 3 | Status: ACTIVE | COMMUNITY
Start: 2022-12-07 | End: 2023-12-02

## 2023-08-30 RX ORDER — LORATADINE 10 MG
1 TABLET TABLET ORAL ONCE A DAY
COMMUNITY

## 2023-08-30 RX ORDER — METOPROLOL SUCCINATE 50 MG/1
TABLET, EXTENDED RELEASE ORAL
Qty: 90 TABLET | COMMUNITY

## 2023-08-30 RX ORDER — LIDOCAINE 50 MG/G
1 PATCH APPLIED TOPICALLY FOR 12 HRS ONCE A DAY AS NEEDED FOR PAIN 30 DAY(S) PATCH CUTANEOUS
Qty: 30 EACH | Refills: 0 | COMMUNITY

## 2023-08-30 RX ORDER — ATORVASTATIN CALCIUM, FILM COATED 40 MG/1
TAKE 1 TABLET BY MOUTH EVERYDAY AT BEDTIME TABLET ORAL
Qty: 90 EACH | Refills: 1 | COMMUNITY

## 2023-08-30 RX ORDER — CYANOCOBALAMIN 1000 UG/ML
1 ML INJECTION INTRAMUSCULAR; SUBCUTANEOUS
Status: ACTIVE | COMMUNITY

## 2023-08-30 RX ORDER — SITAGLIPTIN AND METFORMIN HYDROCHLORIDE 50; 1000 MG/1; MG/1
1 TABLET WITH MEALS TABLET, FILM COATED ORAL TWICE A DAY
Status: ACTIVE | COMMUNITY

## 2023-08-30 RX ORDER — ADALIMUMAB 40MG/0.4ML
KIT SUBCUTANEOUS
Qty: 2 EACH | COMMUNITY

## 2023-08-30 RX ORDER — ARIPIPRAZOLE 5 MG/1
1/2 TABLET TABLET ORAL ONCE A DAY
COMMUNITY

## 2023-08-30 RX ORDER — BUPROPION HYDROCHLORIDE 300 MG/1
1 TABLET IN THE MORNING TABLET, FILM COATED, EXTENDED RELEASE ORAL ONCE A DAY
Status: ACTIVE | COMMUNITY

## 2023-08-30 RX ORDER — VITAMIN A 2400 MCG
1 TABLET CAPSULE ORAL TWICE DAILY
COMMUNITY

## 2023-08-30 RX ORDER — SEMAGLUTIDE 0.68 MG/ML
INJECTION, SOLUTION SUBCUTANEOUS
Qty: 3 MILLILITER | Status: ON HOLD | COMMUNITY

## 2023-08-30 RX ORDER — DICYCLOMINE HYDROCHLORIDE 20 MG/1
TABLET ORAL
Qty: 10 TABLET | COMMUNITY

## 2023-09-22 ENCOUNTER — TELEPHONE ENCOUNTER (OUTPATIENT)
Dept: URBAN - NONMETROPOLITAN AREA CLINIC 2 | Facility: CLINIC | Age: 73
End: 2023-09-22

## 2023-10-03 ENCOUNTER — TELEPHONE ENCOUNTER (OUTPATIENT)
Dept: URBAN - NONMETROPOLITAN AREA CLINIC 2 | Facility: CLINIC | Age: 73
End: 2023-10-03

## 2023-10-03 RX ORDER — ATORVASTATIN CALCIUM, FILM COATED 40 MG/1
TAKE 1 TABLET BY MOUTH EVERYDAY AT BEDTIME TABLET ORAL
Qty: 90 EACH | Refills: 1 | COMMUNITY

## 2023-10-03 RX ORDER — SEMAGLUTIDE 0.68 MG/ML
INJECTION, SOLUTION SUBCUTANEOUS
Qty: 3 MILLILITER | Status: ON HOLD | COMMUNITY

## 2023-10-03 RX ORDER — LORAZEPAM 0.5 MG/1
1 TABLET TABLET ORAL AS NEEDED
Status: ACTIVE | COMMUNITY

## 2023-10-03 RX ORDER — ADALIMUMAB 40MG/0.4ML
KIT SUBCUTANEOUS
Qty: 2 EACH | COMMUNITY

## 2023-10-03 RX ORDER — POLYETHYLENE GLYCOL 3350, SODIUM SULFATE, SODIUM CHLORIDE, POTASSIUM CHLORIDE, ASCORBIC ACID, SODIUM ASCORBATE 140-9-5.2G
177ML KIT ORAL ONCE
Qty: 280 GRAM | Refills: 0 | OUTPATIENT
Start: 2023-10-03 | End: 2023-10-04

## 2023-10-03 RX ORDER — GABAPENTIN 300 MG/1
1 CAPSULE CAPSULE ORAL TWICE DAILY
COMMUNITY

## 2023-10-03 RX ORDER — SUCRALFATE 1 G/1
TABLET ORAL
Qty: 15 TABLET | COMMUNITY

## 2023-10-03 RX ORDER — SITAGLIPTIN AND METFORMIN HYDROCHLORIDE 50; 1000 MG/1; MG/1
1 TABLET WITH MEALS TABLET, FILM COATED ORAL TWICE A DAY
Status: ACTIVE | COMMUNITY

## 2023-10-03 RX ORDER — METOPROLOL SUCCINATE 50 MG/1
TABLET, EXTENDED RELEASE ORAL
Qty: 90 TABLET | COMMUNITY

## 2023-10-03 RX ORDER — BUPROPION HYDROCHLORIDE 300 MG/1
1 TABLET IN THE MORNING TABLET, FILM COATED, EXTENDED RELEASE ORAL ONCE A DAY
Status: ACTIVE | COMMUNITY

## 2023-10-03 RX ORDER — TRAZODONE HYDROCHLORIDE 50 MG/1
TAKE 1/2 TABLET BY MOUTH AT BEDTIME TABLET ORAL
Qty: 15 EACH | Refills: 0 | COMMUNITY

## 2023-10-03 RX ORDER — ARIPIPRAZOLE 5 MG/1
1/2 TABLET TABLET ORAL ONCE A DAY
COMMUNITY

## 2023-10-03 RX ORDER — GLIMEPIRIDE 4 MG/1
1 TABLET WITH BREAKFAST OR THE FIRST MAIN MEAL OF THE DAY TABLET ORAL TWICE DAILY
Status: ACTIVE | COMMUNITY

## 2023-10-03 RX ORDER — VITAMIN A 2400 MCG
1 TABLET CAPSULE ORAL TWICE DAILY
COMMUNITY

## 2023-10-03 RX ORDER — CYANOCOBALAMIN 1000 UG/ML
1 ML INJECTION INTRAMUSCULAR; SUBCUTANEOUS
Status: ACTIVE | COMMUNITY

## 2023-10-03 RX ORDER — DICYCLOMINE HYDROCHLORIDE 20 MG/1
TABLET ORAL
Qty: 10 TABLET | COMMUNITY

## 2023-10-03 RX ORDER — LORATADINE 10 MG
1 TABLET TABLET ORAL ONCE A DAY
COMMUNITY

## 2023-10-03 RX ORDER — ACETAMINOPHEN AND CODEINE PHOSPHATE 300; 30 MG/1; MG/1
TABLET ORAL
Qty: 24 TABLET | COMMUNITY

## 2023-10-03 RX ORDER — LIDOCAINE 50 MG/G
1 PATCH APPLIED TOPICALLY FOR 12 HRS ONCE A DAY AS NEEDED FOR PAIN 30 DAY(S) PATCH CUTANEOUS
Qty: 30 EACH | Refills: 0 | COMMUNITY

## 2023-10-03 RX ORDER — LAMOTRIGINE 100 MG/1
1 TABLET TABLET ORAL ONCE A DAY
Status: ACTIVE | COMMUNITY

## 2023-10-03 RX ORDER — FERROUS SULFATE 325(65) MG
1 TABLET TABLET ORAL BID
Qty: 60 TABLET | Refills: 11 | COMMUNITY
Start: 2021-01-11

## 2023-10-03 RX ORDER — ADALIMUMAB 40MG/0.4ML
1 PEN SUBCUTANEUS EVERY 2 WEEKS KIT SUBCUTANEOUS EVERY 2 WEEKS
Qty: 6 PEN NEEDLE | Refills: 3 | Status: ACTIVE | COMMUNITY
Start: 2022-12-07 | End: 2023-12-02

## 2023-10-05 ENCOUNTER — LAB OUTSIDE AN ENCOUNTER (OUTPATIENT)
Dept: URBAN - NONMETROPOLITAN AREA CLINIC 2 | Facility: CLINIC | Age: 73
End: 2023-10-05

## 2023-10-05 ENCOUNTER — OFFICE VISIT (OUTPATIENT)
Dept: URBAN - METROPOLITAN AREA MEDICAL CENTER 1 | Facility: MEDICAL CENTER | Age: 73
End: 2023-10-05
Payer: MEDICARE

## 2023-10-05 DIAGNOSIS — K50.80 CROHN'S COLITIS: ICD-10-CM

## 2023-10-05 PROCEDURE — 45380 COLONOSCOPY AND BIOPSY: CPT | Performed by: INTERNAL MEDICINE

## 2023-10-05 RX ORDER — BUPROPION HYDROCHLORIDE 300 MG/1
1 TABLET IN THE MORNING TABLET, FILM COATED, EXTENDED RELEASE ORAL ONCE A DAY
Status: ACTIVE | COMMUNITY

## 2023-10-05 RX ORDER — CYANOCOBALAMIN 1000 UG/ML
1 ML INJECTION INTRAMUSCULAR; SUBCUTANEOUS
Status: ACTIVE | COMMUNITY

## 2023-10-05 RX ORDER — LORAZEPAM 0.5 MG/1
1 TABLET TABLET ORAL AS NEEDED
Status: ACTIVE | COMMUNITY

## 2023-10-05 RX ORDER — ARIPIPRAZOLE 5 MG/1
1/2 TABLET TABLET ORAL ONCE A DAY
COMMUNITY

## 2023-10-05 RX ORDER — ATORVASTATIN CALCIUM, FILM COATED 40 MG/1
TAKE 1 TABLET BY MOUTH EVERYDAY AT BEDTIME TABLET ORAL
Qty: 90 EACH | Refills: 1 | COMMUNITY

## 2023-10-05 RX ORDER — GLIMEPIRIDE 4 MG/1
1 TABLET WITH BREAKFAST OR THE FIRST MAIN MEAL OF THE DAY TABLET ORAL TWICE DAILY
Status: ACTIVE | COMMUNITY

## 2023-10-05 RX ORDER — VITAMIN A 2400 MCG
1 TABLET CAPSULE ORAL TWICE DAILY
COMMUNITY

## 2023-10-05 RX ORDER — ADALIMUMAB 40MG/0.4ML
KIT SUBCUTANEOUS
Qty: 2 EACH | COMMUNITY

## 2023-10-05 RX ORDER — DICYCLOMINE HYDROCHLORIDE 20 MG/1
TABLET ORAL
Qty: 10 TABLET | COMMUNITY

## 2023-10-05 RX ORDER — METOPROLOL SUCCINATE 50 MG/1
TABLET, EXTENDED RELEASE ORAL
Qty: 90 TABLET | COMMUNITY

## 2023-10-05 RX ORDER — LORATADINE 10 MG
1 TABLET TABLET ORAL ONCE A DAY
COMMUNITY

## 2023-10-05 RX ORDER — SEMAGLUTIDE 0.68 MG/ML
INJECTION, SOLUTION SUBCUTANEOUS
Qty: 3 MILLILITER | Status: ON HOLD | COMMUNITY

## 2023-10-05 RX ORDER — SITAGLIPTIN AND METFORMIN HYDROCHLORIDE 50; 1000 MG/1; MG/1
1 TABLET WITH MEALS TABLET, FILM COATED ORAL TWICE A DAY
Status: ACTIVE | COMMUNITY

## 2023-10-05 RX ORDER — ADALIMUMAB 40MG/0.4ML
1 PEN SUBCUTANEUS EVERY 2 WEEKS KIT SUBCUTANEOUS EVERY 2 WEEKS
Qty: 6 PEN NEEDLE | Refills: 3 | Status: ACTIVE | COMMUNITY
Start: 2022-12-07 | End: 2023-12-02

## 2023-10-05 RX ORDER — GABAPENTIN 300 MG/1
1 CAPSULE CAPSULE ORAL TWICE DAILY
COMMUNITY

## 2023-10-05 RX ORDER — SUCRALFATE 1 G/1
TABLET ORAL
Qty: 15 TABLET | COMMUNITY

## 2023-10-05 RX ORDER — FERROUS SULFATE 325(65) MG
1 TABLET TABLET ORAL BID
Qty: 60 TABLET | Refills: 11 | COMMUNITY
Start: 2021-01-11

## 2023-10-05 RX ORDER — ACETAMINOPHEN AND CODEINE PHOSPHATE 300; 30 MG/1; MG/1
TABLET ORAL
Qty: 24 TABLET | COMMUNITY

## 2023-10-05 RX ORDER — LAMOTRIGINE 100 MG/1
1 TABLET TABLET ORAL ONCE A DAY
Status: ACTIVE | COMMUNITY

## 2023-10-05 RX ORDER — TRAZODONE HYDROCHLORIDE 50 MG/1
TAKE 1/2 TABLET BY MOUTH AT BEDTIME TABLET ORAL
Qty: 15 EACH | Refills: 0 | COMMUNITY

## 2023-10-05 RX ORDER — LIDOCAINE 50 MG/G
1 PATCH APPLIED TOPICALLY FOR 12 HRS ONCE A DAY AS NEEDED FOR PAIN 30 DAY(S) PATCH CUTANEOUS
Qty: 30 EACH | Refills: 0 | COMMUNITY

## 2023-10-06 ENCOUNTER — TELEPHONE ENCOUNTER (OUTPATIENT)
Dept: URBAN - NONMETROPOLITAN AREA CLINIC 2 | Facility: CLINIC | Age: 73
End: 2023-10-06

## 2023-10-06 LAB
AP CASE REPORT: (no result)
AP FINAL DIAGNOSIS: (no result)
AP GROSS DESCRIPTION: (no result)
AP MICROSCOPIC DESCRIPTION: (no result)

## 2023-10-11 ENCOUNTER — TELEPHONE ENCOUNTER (OUTPATIENT)
Dept: URBAN - NONMETROPOLITAN AREA CLINIC 2 | Facility: CLINIC | Age: 73
End: 2023-10-11

## 2023-10-11 ENCOUNTER — OFFICE VISIT (OUTPATIENT)
Dept: URBAN - NONMETROPOLITAN AREA CLINIC 13 | Facility: CLINIC | Age: 73
End: 2023-10-11
Payer: MEDICARE

## 2023-10-11 VITALS
SYSTOLIC BLOOD PRESSURE: 127 MMHG | HEART RATE: 86 BPM | HEIGHT: 56 IN | DIASTOLIC BLOOD PRESSURE: 74 MMHG | BODY MASS INDEX: 40.13 KG/M2 | TEMPERATURE: 98.5 F | WEIGHT: 178.4 LBS

## 2023-10-11 DIAGNOSIS — K50.10 CROHN'S DISEASE OF COLON WITHOUT COMPLICATION: ICD-10-CM

## 2023-10-11 DIAGNOSIS — K59.09 CHANGE IN BOWEL MOVEMENTS INTERMITTENT CONSTIPATION. URGENCY IN THE MORNING.: ICD-10-CM

## 2023-10-11 DIAGNOSIS — R43.2 DYSGEUSIA: ICD-10-CM

## 2023-10-11 PROBLEM — 50440006: Status: ACTIVE | Noted: 2021-01-11

## 2023-10-11 PROBLEM — 275978004 COLON CANCER SCREENING: Status: ACTIVE | Noted: 2021-01-11

## 2023-10-11 PROBLEM — 82934008: Status: ACTIVE | Noted: 2023-08-16

## 2023-10-11 PROBLEM — 271801002: Status: ACTIVE | Noted: 2023-08-16

## 2023-10-11 PROCEDURE — 99214 OFFICE O/P EST MOD 30 MIN: CPT | Performed by: NURSE PRACTITIONER

## 2023-10-11 RX ORDER — SUCRALFATE 1 G/1
TABLET ORAL
Qty: 15 TABLET | COMMUNITY

## 2023-10-11 RX ORDER — DICYCLOMINE HYDROCHLORIDE 20 MG/1
TABLET ORAL
Qty: 10 TABLET | COMMUNITY

## 2023-10-11 RX ORDER — LORAZEPAM 0.5 MG/1
1 TABLET TABLET ORAL AS NEEDED
Status: ACTIVE | COMMUNITY

## 2023-10-11 RX ORDER — TRAZODONE HYDROCHLORIDE 50 MG/1
TAKE 1/2 TABLET BY MOUTH AT BEDTIME TABLET ORAL
Qty: 15 EACH | Refills: 0 | COMMUNITY

## 2023-10-11 RX ORDER — GABAPENTIN 300 MG/1
1 CAPSULE CAPSULE ORAL TWICE DAILY
COMMUNITY

## 2023-10-11 RX ORDER — ARIPIPRAZOLE 5 MG/1
1/2 TABLET TABLET ORAL ONCE A DAY
COMMUNITY

## 2023-10-11 RX ORDER — RISANKIZUMAB-RZAA 180 MG/1.2
AT WEEK 12, THEN EVERY 8 WEEKS KIT SUBCUTANEOUS
Qty: 1 UNSPECIFIED | Refills: 6 | OUTPATIENT
Start: 2023-10-11 | End: 2024-11-05

## 2023-10-11 RX ORDER — ACETAMINOPHEN AND CODEINE PHOSPHATE 300; 30 MG/1; MG/1
TABLET ORAL
Qty: 24 TABLET | COMMUNITY

## 2023-10-11 RX ORDER — LIDOCAINE 50 MG/G
1 PATCH APPLIED TOPICALLY FOR 12 HRS ONCE A DAY AS NEEDED FOR PAIN 30 DAY(S) PATCH CUTANEOUS
Qty: 30 EACH | Refills: 0 | COMMUNITY

## 2023-10-11 RX ORDER — LAMOTRIGINE 100 MG/1
1 TABLET TABLET ORAL ONCE A DAY
Status: ACTIVE | COMMUNITY

## 2023-10-11 RX ORDER — METOPROLOL SUCCINATE 50 MG/1
TABLET, EXTENDED RELEASE ORAL
Qty: 90 TABLET | COMMUNITY

## 2023-10-11 RX ORDER — GLIMEPIRIDE 4 MG/1
1 TABLET WITH BREAKFAST OR THE FIRST MAIN MEAL OF THE DAY TABLET ORAL TWICE DAILY
Status: ACTIVE | COMMUNITY

## 2023-10-11 RX ORDER — ADALIMUMAB 40MG/0.4ML
KIT SUBCUTANEOUS
Qty: 2 EACH | COMMUNITY

## 2023-10-11 RX ORDER — FERROUS SULFATE 325(65) MG
1 TABLET TABLET ORAL BID
Qty: 60 TABLET | Refills: 11 | Status: ACTIVE | COMMUNITY
Start: 2021-01-11

## 2023-10-11 RX ORDER — ATORVASTATIN CALCIUM, FILM COATED 40 MG/1
TAKE 1 TABLET BY MOUTH EVERYDAY AT BEDTIME TABLET ORAL
Qty: 90 EACH | Refills: 1 | COMMUNITY

## 2023-10-11 RX ORDER — ADALIMUMAB 40MG/0.4ML
1 PEN SUBCUTANEUS EVERY 2 WEEKS KIT SUBCUTANEOUS EVERY 2 WEEKS
Qty: 6 PEN NEEDLE | Refills: 3 | Status: ON HOLD | COMMUNITY
Start: 2022-12-07 | End: 2023-12-02

## 2023-10-11 RX ORDER — SEMAGLUTIDE 0.68 MG/ML
INJECTION, SOLUTION SUBCUTANEOUS
Qty: 3 MILLILITER | Status: ON HOLD | COMMUNITY

## 2023-10-11 RX ORDER — VITAMIN A 2400 MCG
1 TABLET CAPSULE ORAL TWICE DAILY
COMMUNITY

## 2023-10-11 RX ORDER — LORATADINE 10 MG
1 TABLET TABLET ORAL ONCE A DAY
COMMUNITY

## 2023-10-11 RX ORDER — BUPROPION HYDROCHLORIDE 300 MG/1
1 TABLET IN THE MORNING TABLET, FILM COATED, EXTENDED RELEASE ORAL ONCE A DAY
Status: ACTIVE | COMMUNITY

## 2023-10-11 RX ORDER — CYANOCOBALAMIN 1000 UG/ML
1 ML INJECTION INTRAMUSCULAR; SUBCUTANEOUS
Status: ACTIVE | COMMUNITY

## 2023-10-11 RX ORDER — SITAGLIPTIN AND METFORMIN HYDROCHLORIDE 50; 1000 MG/1; MG/1
1 TABLET WITH MEALS TABLET, FILM COATED ORAL TWICE A DAY
Status: ACTIVE | COMMUNITY

## 2023-10-11 RX ORDER — RISANKIZUMAB-RZAA 60 MG/ML
AS DIRECTED INJECTION INTRAVENOUS
Qty: 600 | Refills: 0 | OUTPATIENT
Start: 2023-10-11

## 2023-10-11 NOTE — HPI-TODAY'S VISIT:
Marybeth presents for evaluation of Crohn's colitis.  She was originally diagnosed in 2018 by Dr. Robbin Mix per her report, she states that she developed acute diarrhea and was told that she had Crohn's colitis.  She was never treated with any remission therapy, she thinks that she took steroids.  She had no issues over the past 2 years but recently has had increased tenesmus and urgency with incontinence.  She had a repeat colonoscopy in June by Dr. Robbin Mix with active Crohn's colitis.  He recommended Humira which she started 2 weeks ago.  She has taken her first 2 induction doses, she is due for maintenance.  She is switching providers given issues with communication with Dr. Mix's office.  She states that she has a bowel movement most days of the week with urgency and tenesmus, at times she has 2-3 urgent bowel movements in a day, however she does have episodes of incomplete evacuation in between these periods.  She denies any mucus or bleeding.  We do not have a copy of her records from Dr. Mix.  She has not had any blood work recently.  Today she is here to establish care, and she is otherwise doing well.  MB   10/19/2020 Marybeth presents for follow-up of Crohn's colitis.  Since her last visit we did get Dr. Mix's office notes but have not received his colonoscopy or pathology results.  She is doing well on Humira every other week with no complaints regarding her injection.  Now her bowels are more alternating, she will have no bowel movement for 2 to 3 days, passed a large hard stool and then have some loose stool after this.  The cycle will start over following.  She did meet with Sue and is trying to follow an anti-inflammatory diet.  She denies any mucus or bleeding associated with the symptoms.  She has not had any blood work since her last visit, today she is doing well otherwise.  MB   1/11/2021  Marybeth presents for followup of Crohn's colitis. She is doing well today on Humira every other week. She has no significant diarrhea so long as she maintains a low fiber diet. She is due for lab work. She is moving into [facility] and will need a paper rx for some prescriptions. Today she is doing well otherwise. MB  7/12/2021 Mrs. Cho presents for follow-up of Crohn's disease.  Since her last visit she has been doing well in regard to her disease.  She is on Colace daily with no symptoms of flare.  She denies any abdominal pain or rectal bleeding.  She has had some mild local reactions to her injections with some swelling.  She has no systemic complaints.  She is in the [place] study locally at [facility] following with her Covid antibodies.  She now has low antibody levels.  She has been recommended for an immunologic work-up per the [place] team.  Her last colonoscopy was done in 2019 by Dr. Robbin Mix with Crohn's colitis.  We still do not have the actual report but we do have his office notes with moderate active disease.  Today she agrees to repeat colonoscopy for surveillance, she does agree to Humira serologies to ensure no antibody development given local reactions, and we have discussed referral to allergy and immunology for evaluation of poor response to the Covid vaccine.  MB 11/30/2021 Marybeth presents for colonoscopy follow-up.  Her labs show no Humira antibodies, she does have low normal serum drug level.  Her colonoscopy reveals 3 inflammatory polyps, right-sided inflammation on colonoscopy, normal terminal ileum.  Her biopsy showed active inflammation in the left and right colon.  She is having 1-2 bowel movements daily.  These are soft at times however she has been on Colace twice daily.  Today we discussed decreasing that.  She denies any urgency, mucus or bleeding.  We did discuss her Humira serologies and that given her low normal serum drug level and active inflammation we could consider going to weekly dosing.  For now she wants to continue her biweekly dosing and continue to monitor her symptoms.  She agrees to try you serous and increase her Humira to weekly if she flares.  MB 5/17/2022 Marybeth presents for follow-up of Crohn's disease.  Since her last visit she is actually been doing very well.  She is taking your Humira injection every other week.  She is had no flares.  She is having 1-2 soft bowel movement daily.  She is up-to-date on her skin cancer screening.  She is now following with Dr. Ratna bloom and will discuss a Pap smear with her.  She is due for repeat labs today.  Otherwise she has no new GI complaints.  She would be due for repeat surveillance colonoscopy next year.  MB 1/25/2023 Marybeth presents for follow-up of Crohn's disease.  She is doing well Humira 40 mg every 2 weeks.  She denies any flares.  She has 1-2 soft bowel movements daily with no bleeding or abdominal pain.  Today we have discussed Humira long-term.  She is interested in discontinuing therapy.  We have discussed risks and benefits of Humira along with unmanaged Crohn's disease.  She is due for repeat surveillance colonoscopy this October.  We will follow-up depending on her endoscopic findings.  Today she is doing well otherwise and due for repeat lab work.  MB 8/16/2023 Marybeth presents for follow-up of Crohn's disease.  Since her last visit she underwent coronary artery bypass graft by Dr. Inman in February.  She was told to hold her Humira 4 weeks before and 4 weeks after but she never restarted.  Postoperatively she developed constipation, she is been taking stool softeners with persistent constipation.  She denies any bleeding or diarrhea.  She recently started Ozempic and developed acute left-sided abdominal pain.  She does agree to hold this for now.  We have discussed restarting Humira versus an alternative biologic with her heart disease.  Today Sherrie check her labs along with her Humira serologies.  We will consider reinduction with Humira versus colonoscopy and alternative biologic therapy pending her results.  Today she is actually doing fairly well in regard to her GI complaints.  She is doing well in regard to her heart disease, she had an uncomplicated postop course.  MB 10/11/2023 Marybeth presents for follow-up of Crohn's colitis.  Since her last visit her Humira titers are elevated with positive antibodies at 60.  Her colonoscopy shows active pancolitis with active disease on biopsies.  She is going up to 5 days without a bowel movement on MiraLAX daily, she is not consistent taking this on a regular basis and agrees to try and be more vigilant on taking this daily and adding Colace.  Today she continues to struggle with her bowel movements and she has active Crohn's colitis.  We have discussed risks and benefits of remission therapy, she has failed Humira 40 mg subcutaneously, we will pursue Skyrizi with induction at 600 mg at week 04 and 8 and then 180 mg subcu at week 12 then it every 8 weeks with the on body injector.  MB

## 2023-10-13 ENCOUNTER — TELEPHONE ENCOUNTER (OUTPATIENT)
Dept: URBAN - NONMETROPOLITAN AREA CLINIC 2 | Facility: CLINIC | Age: 73
End: 2023-10-13

## 2023-11-01 ENCOUNTER — CLAIMS CREATED FROM THE CLAIM WINDOW (OUTPATIENT)
Dept: URBAN - METROPOLITAN AREA CLINIC 114 | Facility: CLINIC | Age: 73
End: 2023-11-01
Payer: MEDICARE

## 2023-11-01 VITALS
RESPIRATION RATE: 20 BRPM | HEART RATE: 73 BPM | WEIGHT: 175 LBS | BODY MASS INDEX: 39.37 KG/M2 | DIASTOLIC BLOOD PRESSURE: 67 MMHG | HEIGHT: 56 IN | TEMPERATURE: 97.4 F | SYSTOLIC BLOOD PRESSURE: 140 MMHG

## 2023-11-01 DIAGNOSIS — K50.80 CROHN'S COLITIS: ICD-10-CM

## 2023-11-01 PROCEDURE — 96413 CHEMO IV INFUSION 1 HR: CPT | Performed by: INTERNAL MEDICINE

## 2023-11-01 RX ORDER — LIDOCAINE 50 MG/G
1 PATCH APPLIED TOPICALLY FOR 12 HRS ONCE A DAY AS NEEDED FOR PAIN 30 DAY(S) PATCH CUTANEOUS
Qty: 30 EACH | Refills: 0 | COMMUNITY

## 2023-11-01 RX ORDER — BUPROPION HYDROCHLORIDE 300 MG/1
1 TABLET IN THE MORNING TABLET, FILM COATED, EXTENDED RELEASE ORAL ONCE A DAY
Status: ACTIVE | COMMUNITY

## 2023-11-01 RX ORDER — LAMOTRIGINE 100 MG/1
1 TABLET TABLET ORAL ONCE A DAY
Status: ACTIVE | COMMUNITY

## 2023-11-01 RX ORDER — TRAZODONE HYDROCHLORIDE 50 MG/1
TAKE 1/2 TABLET BY MOUTH AT BEDTIME TABLET ORAL
Qty: 15 EACH | Refills: 0 | COMMUNITY

## 2023-11-01 RX ORDER — DICYCLOMINE HYDROCHLORIDE 20 MG/1
TABLET ORAL
Qty: 10 TABLET | COMMUNITY

## 2023-11-01 RX ORDER — LORATADINE 10 MG
1 TABLET TABLET ORAL ONCE A DAY
COMMUNITY

## 2023-11-01 RX ORDER — SUCRALFATE 1 G/1
TABLET ORAL
Qty: 15 TABLET | COMMUNITY

## 2023-11-01 RX ORDER — ADALIMUMAB 40MG/0.4ML
1 PEN SUBCUTANEUS EVERY 2 WEEKS KIT SUBCUTANEOUS EVERY 2 WEEKS
Qty: 6 PEN NEEDLE | Refills: 3 | Status: ON HOLD | COMMUNITY
Start: 2022-12-07 | End: 2023-12-02

## 2023-11-01 RX ORDER — SEMAGLUTIDE 0.68 MG/ML
INJECTION, SOLUTION SUBCUTANEOUS
Qty: 3 MILLILITER | Status: ON HOLD | COMMUNITY

## 2023-11-01 RX ORDER — RISANKIZUMAB-RZAA 60 MG/ML
AS DIRECTED INJECTION INTRAVENOUS
Qty: 600 | Refills: 0 | Status: ACTIVE | COMMUNITY
Start: 2023-10-11

## 2023-11-01 RX ORDER — FERROUS SULFATE 325(65) MG
1 TABLET TABLET ORAL BID
Qty: 60 TABLET | Refills: 11 | Status: ACTIVE | COMMUNITY
Start: 2021-01-11

## 2023-11-01 RX ORDER — ARIPIPRAZOLE 5 MG/1
1/2 TABLET TABLET ORAL ONCE A DAY
COMMUNITY

## 2023-11-01 RX ORDER — GABAPENTIN 300 MG/1
1 CAPSULE CAPSULE ORAL TWICE DAILY
COMMUNITY

## 2023-11-01 RX ORDER — METOPROLOL SUCCINATE 50 MG/1
TABLET, EXTENDED RELEASE ORAL
Qty: 90 TABLET | COMMUNITY

## 2023-11-01 RX ORDER — ATORVASTATIN CALCIUM, FILM COATED 40 MG/1
TAKE 1 TABLET BY MOUTH EVERYDAY AT BEDTIME TABLET ORAL
Qty: 90 EACH | Refills: 1 | COMMUNITY

## 2023-11-01 RX ORDER — ACETAMINOPHEN AND CODEINE PHOSPHATE 300; 30 MG/1; MG/1
TABLET ORAL
Qty: 24 TABLET | COMMUNITY

## 2023-11-01 RX ORDER — SITAGLIPTIN AND METFORMIN HYDROCHLORIDE 50; 1000 MG/1; MG/1
1 TABLET WITH MEALS TABLET, FILM COATED ORAL TWICE A DAY
Status: ACTIVE | COMMUNITY

## 2023-11-01 RX ORDER — VITAMIN A 2400 MCG
1 TABLET CAPSULE ORAL TWICE DAILY
COMMUNITY

## 2023-11-01 RX ORDER — LORAZEPAM 0.5 MG/1
1 TABLET TABLET ORAL AS NEEDED
Status: ACTIVE | COMMUNITY

## 2023-11-01 RX ORDER — CYANOCOBALAMIN 1000 UG/ML
1 ML INJECTION INTRAMUSCULAR; SUBCUTANEOUS
Status: ACTIVE | COMMUNITY

## 2023-11-01 RX ORDER — ADALIMUMAB 40MG/0.4ML
KIT SUBCUTANEOUS
Qty: 2 EACH | COMMUNITY

## 2023-11-01 RX ORDER — GLIMEPIRIDE 4 MG/1
1 TABLET WITH BREAKFAST OR THE FIRST MAIN MEAL OF THE DAY TABLET ORAL TWICE DAILY
Status: ACTIVE | COMMUNITY

## 2023-11-01 RX ORDER — RISANKIZUMAB-RZAA 180 MG/1.2
AT WEEK 12, THEN EVERY 8 WEEKS KIT SUBCUTANEOUS
Qty: 1 UNSPECIFIED | Refills: 6 | Status: ACTIVE | COMMUNITY
Start: 2023-10-11 | End: 2024-11-05

## 2023-11-29 ENCOUNTER — OFFICE VISIT (OUTPATIENT)
Dept: URBAN - NONMETROPOLITAN AREA CLINIC 1 | Facility: CLINIC | Age: 73
End: 2023-11-29

## 2023-11-29 ENCOUNTER — TELEPHONE ENCOUNTER (OUTPATIENT)
Dept: URBAN - NONMETROPOLITAN AREA CLINIC 2 | Facility: CLINIC | Age: 73
End: 2023-11-29

## 2023-11-30 ENCOUNTER — OFFICE VISIT (OUTPATIENT)
Dept: URBAN - NONMETROPOLITAN AREA CLINIC 2 | Facility: CLINIC | Age: 73
End: 2023-11-30

## 2023-12-13 ENCOUNTER — OFFICE VISIT (OUTPATIENT)
Dept: URBAN - NONMETROPOLITAN AREA CLINIC 1 | Facility: CLINIC | Age: 73
End: 2023-12-13
Payer: MEDICARE

## 2023-12-13 VITALS
HEIGHT: 56 IN | WEIGHT: 175 LBS | DIASTOLIC BLOOD PRESSURE: 71 MMHG | BODY MASS INDEX: 39.37 KG/M2 | TEMPERATURE: 96.8 F | SYSTOLIC BLOOD PRESSURE: 127 MMHG

## 2023-12-13 DIAGNOSIS — K50.80 CROHN'S COLITIS: ICD-10-CM

## 2023-12-13 PROCEDURE — 96365 THER/PROPH/DIAG IV INF INIT: CPT | Performed by: INTERNAL MEDICINE

## 2023-12-13 RX ORDER — TRAZODONE HYDROCHLORIDE 50 MG/1
TAKE 1/2 TABLET BY MOUTH AT BEDTIME TABLET ORAL
Qty: 15 EACH | Refills: 0 | COMMUNITY

## 2023-12-13 RX ORDER — ADALIMUMAB 40MG/0.4ML
KIT SUBCUTANEOUS
Qty: 2 EACH | COMMUNITY

## 2023-12-13 RX ORDER — LORATADINE 10 MG
1 TABLET TABLET ORAL ONCE A DAY
COMMUNITY

## 2023-12-13 RX ORDER — GLIMEPIRIDE 4 MG/1
1 TABLET WITH BREAKFAST OR THE FIRST MAIN MEAL OF THE DAY TABLET ORAL TWICE DAILY
Status: ACTIVE | COMMUNITY

## 2023-12-13 RX ORDER — RISANKIZUMAB-RZAA 60 MG/ML
AS DIRECTED INJECTION INTRAVENOUS
Qty: 600 | Refills: 0 | Status: ACTIVE | COMMUNITY
Start: 2023-10-11

## 2023-12-13 RX ORDER — METOPROLOL SUCCINATE 50 MG/1
TABLET, EXTENDED RELEASE ORAL
Qty: 90 TABLET | COMMUNITY

## 2023-12-13 RX ORDER — LIDOCAINE 50 MG/G
1 PATCH APPLIED TOPICALLY FOR 12 HRS ONCE A DAY AS NEEDED FOR PAIN 30 DAY(S) PATCH CUTANEOUS
Qty: 30 EACH | Refills: 0 | COMMUNITY

## 2023-12-13 RX ORDER — FERROUS SULFATE 325(65) MG
1 TABLET TABLET ORAL BID
Qty: 60 TABLET | Refills: 11 | Status: ACTIVE | COMMUNITY
Start: 2021-01-11

## 2023-12-13 RX ORDER — ARIPIPRAZOLE 5 MG/1
1/2 TABLET TABLET ORAL ONCE A DAY
COMMUNITY

## 2023-12-13 RX ORDER — CYANOCOBALAMIN 1000 UG/ML
1 ML INJECTION INTRAMUSCULAR; SUBCUTANEOUS
Status: ACTIVE | COMMUNITY

## 2023-12-13 RX ORDER — SEMAGLUTIDE 0.68 MG/ML
INJECTION, SOLUTION SUBCUTANEOUS
Qty: 3 MILLILITER | Status: ON HOLD | COMMUNITY

## 2023-12-13 RX ORDER — SUCRALFATE 1 G/1
TABLET ORAL
Qty: 15 TABLET | COMMUNITY

## 2023-12-13 RX ORDER — LAMOTRIGINE 100 MG/1
1 TABLET TABLET ORAL ONCE A DAY
Status: ACTIVE | COMMUNITY

## 2023-12-13 RX ORDER — ACETAMINOPHEN AND CODEINE PHOSPHATE 300; 30 MG/1; MG/1
TABLET ORAL
Qty: 24 TABLET | COMMUNITY

## 2023-12-13 RX ORDER — RISANKIZUMAB-RZAA 180 MG/1.2
AT WEEK 12, THEN EVERY 8 WEEKS KIT SUBCUTANEOUS
Qty: 1 UNSPECIFIED | Refills: 6 | Status: ACTIVE | COMMUNITY
Start: 2023-10-11 | End: 2024-11-05

## 2023-12-13 RX ORDER — SITAGLIPTIN AND METFORMIN HYDROCHLORIDE 50; 1000 MG/1; MG/1
1 TABLET WITH MEALS TABLET, FILM COATED ORAL TWICE A DAY
Status: ACTIVE | COMMUNITY

## 2023-12-13 RX ORDER — BUPROPION HYDROCHLORIDE 300 MG/1
1 TABLET IN THE MORNING TABLET, FILM COATED, EXTENDED RELEASE ORAL ONCE A DAY
Status: ACTIVE | COMMUNITY

## 2023-12-13 RX ORDER — VITAMIN A 2400 MCG
1 TABLET CAPSULE ORAL TWICE DAILY
COMMUNITY

## 2023-12-13 RX ORDER — LORAZEPAM 0.5 MG/1
1 TABLET TABLET ORAL AS NEEDED
Status: ACTIVE | COMMUNITY

## 2023-12-13 RX ORDER — GABAPENTIN 300 MG/1
1 CAPSULE CAPSULE ORAL TWICE DAILY
COMMUNITY

## 2023-12-13 RX ORDER — ATORVASTATIN CALCIUM, FILM COATED 40 MG/1
TAKE 1 TABLET BY MOUTH EVERYDAY AT BEDTIME TABLET ORAL
Qty: 90 EACH | Refills: 1 | COMMUNITY

## 2023-12-13 RX ORDER — DICYCLOMINE HYDROCHLORIDE 20 MG/1
TABLET ORAL
Qty: 10 TABLET | COMMUNITY

## 2023-12-27 ENCOUNTER — OFFICE VISIT (OUTPATIENT)
Dept: URBAN - NONMETROPOLITAN AREA CLINIC 1 | Facility: CLINIC | Age: 73
End: 2023-12-27

## 2024-01-05 ENCOUNTER — TELEPHONE ENCOUNTER (OUTPATIENT)
Dept: URBAN - NONMETROPOLITAN AREA CLINIC 2 | Facility: CLINIC | Age: 74
End: 2024-01-05

## 2024-01-10 ENCOUNTER — OFFICE VISIT (OUTPATIENT)
Dept: URBAN - NONMETROPOLITAN AREA CLINIC 1 | Facility: CLINIC | Age: 74
End: 2024-01-10

## 2024-01-10 RX ORDER — ARIPIPRAZOLE 5 MG/1
1/2 TABLET TABLET ORAL ONCE A DAY
COMMUNITY

## 2024-01-10 RX ORDER — SEMAGLUTIDE 0.68 MG/ML
INJECTION, SOLUTION SUBCUTANEOUS
Qty: 3 MILLILITER | Status: ON HOLD | COMMUNITY

## 2024-01-10 RX ORDER — GABAPENTIN 300 MG/1
1 CAPSULE CAPSULE ORAL TWICE DAILY
COMMUNITY

## 2024-01-10 RX ORDER — LORATADINE 10 MG
1 TABLET TABLET ORAL ONCE A DAY
COMMUNITY

## 2024-01-10 RX ORDER — TRAZODONE HYDROCHLORIDE 50 MG/1
TAKE 1/2 TABLET BY MOUTH AT BEDTIME TABLET ORAL
Qty: 15 EACH | Refills: 0 | COMMUNITY

## 2024-01-10 RX ORDER — SUCRALFATE 1 G/1
TABLET ORAL
Qty: 15 TABLET | COMMUNITY

## 2024-01-10 RX ORDER — LORAZEPAM 0.5 MG/1
1 TABLET TABLET ORAL AS NEEDED
Status: ACTIVE | COMMUNITY

## 2024-01-10 RX ORDER — RISANKIZUMAB-RZAA 60 MG/ML
AS DIRECTED INJECTION INTRAVENOUS
Qty: 600 | Refills: 0 | Status: ACTIVE | COMMUNITY
Start: 2023-10-11

## 2024-01-10 RX ORDER — FERROUS SULFATE 325(65) MG
1 TABLET TABLET ORAL BID
Qty: 60 TABLET | Refills: 11 | Status: ACTIVE | COMMUNITY
Start: 2021-01-11

## 2024-01-10 RX ORDER — DICYCLOMINE HYDROCHLORIDE 20 MG/1
TABLET ORAL
Qty: 10 TABLET | COMMUNITY

## 2024-01-10 RX ORDER — SITAGLIPTIN AND METFORMIN HYDROCHLORIDE 50; 1000 MG/1; MG/1
1 TABLET WITH MEALS TABLET, FILM COATED ORAL TWICE A DAY
Status: ACTIVE | COMMUNITY

## 2024-01-10 RX ORDER — ADALIMUMAB 40MG/0.4ML
KIT SUBCUTANEOUS
Qty: 2 EACH | COMMUNITY

## 2024-01-10 RX ORDER — VITAMIN A 2400 MCG
1 TABLET CAPSULE ORAL TWICE DAILY
COMMUNITY

## 2024-01-10 RX ORDER — BUPROPION HYDROCHLORIDE 300 MG/1
1 TABLET IN THE MORNING TABLET, FILM COATED, EXTENDED RELEASE ORAL ONCE A DAY
Status: ACTIVE | COMMUNITY

## 2024-01-10 RX ORDER — CYANOCOBALAMIN 1000 UG/ML
1 ML INJECTION INTRAMUSCULAR; SUBCUTANEOUS
Status: ACTIVE | COMMUNITY

## 2024-01-10 RX ORDER — ATORVASTATIN CALCIUM, FILM COATED 40 MG/1
TAKE 1 TABLET BY MOUTH EVERYDAY AT BEDTIME TABLET ORAL
Qty: 90 EACH | Refills: 1 | COMMUNITY

## 2024-01-10 RX ORDER — GLIMEPIRIDE 4 MG/1
1 TABLET WITH BREAKFAST OR THE FIRST MAIN MEAL OF THE DAY TABLET ORAL TWICE DAILY
Status: ACTIVE | COMMUNITY

## 2024-01-10 RX ORDER — METOPROLOL SUCCINATE 50 MG/1
TABLET, EXTENDED RELEASE ORAL
Qty: 90 TABLET | COMMUNITY

## 2024-01-10 RX ORDER — LAMOTRIGINE 100 MG/1
1 TABLET TABLET ORAL ONCE A DAY
Status: ACTIVE | COMMUNITY

## 2024-01-10 RX ORDER — LIDOCAINE 50 MG/G
1 PATCH APPLIED TOPICALLY FOR 12 HRS ONCE A DAY AS NEEDED FOR PAIN 30 DAY(S) PATCH CUTANEOUS
Qty: 30 EACH | Refills: 0 | COMMUNITY

## 2024-01-10 RX ORDER — RISANKIZUMAB-RZAA 180 MG/1.2
AT WEEK 12, THEN EVERY 8 WEEKS KIT SUBCUTANEOUS
Qty: 1 UNSPECIFIED | Refills: 6 | Status: ACTIVE | COMMUNITY
Start: 2023-10-11 | End: 2024-11-05

## 2024-01-10 RX ORDER — ACETAMINOPHEN AND CODEINE PHOSPHATE 300; 30 MG/1; MG/1
TABLET ORAL
Qty: 24 TABLET | COMMUNITY

## 2024-01-16 ENCOUNTER — TELEPHONE ENCOUNTER (OUTPATIENT)
Dept: URBAN - NONMETROPOLITAN AREA CLINIC 13 | Facility: CLINIC | Age: 74
End: 2024-01-16

## 2024-01-23 ENCOUNTER — OFFICE VISIT (OUTPATIENT)
Dept: URBAN - NONMETROPOLITAN AREA CLINIC 1 | Facility: CLINIC | Age: 74
End: 2024-01-23
Payer: MEDICARE

## 2024-01-23 ENCOUNTER — OFFICE VISIT (OUTPATIENT)
Dept: URBAN - NONMETROPOLITAN AREA CLINIC 2 | Facility: CLINIC | Age: 74
End: 2024-01-23

## 2024-01-23 VITALS
SYSTOLIC BLOOD PRESSURE: 116 MMHG | BODY MASS INDEX: 39.37 KG/M2 | TEMPERATURE: 96.3 F | DIASTOLIC BLOOD PRESSURE: 68 MMHG | HEIGHT: 56 IN | WEIGHT: 175 LBS

## 2024-01-23 DIAGNOSIS — K50.80 CROHN'S COLITIS: ICD-10-CM

## 2024-01-23 PROCEDURE — 96413 CHEMO IV INFUSION 1 HR: CPT | Performed by: INTERNAL MEDICINE

## 2024-01-23 RX ORDER — TRAZODONE HYDROCHLORIDE 50 MG/1
TAKE 1/2 TABLET BY MOUTH AT BEDTIME TABLET ORAL
Qty: 15 EACH | Refills: 0 | COMMUNITY

## 2024-01-23 RX ORDER — RISANKIZUMAB-RZAA 60 MG/ML
AS DIRECTED INJECTION INTRAVENOUS
Qty: 600 | Refills: 0 | Status: ACTIVE | COMMUNITY
Start: 2023-10-11

## 2024-01-23 RX ORDER — METOPROLOL SUCCINATE 50 MG/1
TABLET, EXTENDED RELEASE ORAL
Qty: 90 TABLET | COMMUNITY

## 2024-01-23 RX ORDER — GABAPENTIN 300 MG/1
1 CAPSULE CAPSULE ORAL TWICE DAILY
COMMUNITY

## 2024-01-23 RX ORDER — VITAMIN A 2400 MCG
1 TABLET CAPSULE ORAL TWICE DAILY
COMMUNITY

## 2024-01-23 RX ORDER — ADALIMUMAB 40MG/0.4ML
KIT SUBCUTANEOUS
Qty: 2 EACH | COMMUNITY

## 2024-01-23 RX ORDER — GLIMEPIRIDE 4 MG/1
1 TABLET WITH BREAKFAST OR THE FIRST MAIN MEAL OF THE DAY TABLET ORAL TWICE DAILY
Status: ACTIVE | COMMUNITY

## 2024-01-23 RX ORDER — FERROUS SULFATE 325(65) MG
1 TABLET TABLET ORAL BID
Qty: 60 TABLET | Refills: 11 | Status: ACTIVE | COMMUNITY
Start: 2021-01-11

## 2024-01-23 RX ORDER — ACETAMINOPHEN AND CODEINE PHOSPHATE 300; 30 MG/1; MG/1
TABLET ORAL
Qty: 24 TABLET | COMMUNITY

## 2024-01-23 RX ORDER — LIDOCAINE 50 MG/G
1 PATCH APPLIED TOPICALLY FOR 12 HRS ONCE A DAY AS NEEDED FOR PAIN 30 DAY(S) PATCH CUTANEOUS
Qty: 30 EACH | Refills: 0 | COMMUNITY

## 2024-01-23 RX ORDER — CYANOCOBALAMIN 1000 UG/ML
1 ML INJECTION INTRAMUSCULAR; SUBCUTANEOUS
Status: ACTIVE | COMMUNITY

## 2024-01-23 RX ORDER — LAMOTRIGINE 100 MG/1
1 TABLET TABLET ORAL ONCE A DAY
Status: ACTIVE | COMMUNITY

## 2024-01-23 RX ORDER — SUCRALFATE 1 G/1
TABLET ORAL
Qty: 15 TABLET | COMMUNITY

## 2024-01-23 RX ORDER — LORAZEPAM 0.5 MG/1
1 TABLET TABLET ORAL AS NEEDED
Status: ACTIVE | COMMUNITY

## 2024-01-23 RX ORDER — ATORVASTATIN CALCIUM, FILM COATED 40 MG/1
TAKE 1 TABLET BY MOUTH EVERYDAY AT BEDTIME TABLET ORAL
Qty: 90 EACH | Refills: 1 | COMMUNITY

## 2024-01-23 RX ORDER — ARIPIPRAZOLE 5 MG/1
1/2 TABLET TABLET ORAL ONCE A DAY
COMMUNITY

## 2024-01-23 RX ORDER — BUPROPION HYDROCHLORIDE 300 MG/1
1 TABLET IN THE MORNING TABLET, FILM COATED, EXTENDED RELEASE ORAL ONCE A DAY
Status: ACTIVE | COMMUNITY

## 2024-01-23 RX ORDER — RISANKIZUMAB-RZAA 180 MG/1.2
AT WEEK 12, THEN EVERY 8 WEEKS KIT SUBCUTANEOUS
Qty: 1 UNSPECIFIED | Refills: 6 | Status: ACTIVE | COMMUNITY
Start: 2023-10-11 | End: 2024-11-05

## 2024-01-23 RX ORDER — SEMAGLUTIDE 0.68 MG/ML
INJECTION, SOLUTION SUBCUTANEOUS
Qty: 3 MILLILITER | Status: ON HOLD | COMMUNITY

## 2024-01-23 RX ORDER — SITAGLIPTIN AND METFORMIN HYDROCHLORIDE 50; 1000 MG/1; MG/1
1 TABLET WITH MEALS TABLET, FILM COATED ORAL TWICE A DAY
Status: ACTIVE | COMMUNITY

## 2024-01-23 RX ORDER — DICYCLOMINE HYDROCHLORIDE 20 MG/1
TABLET ORAL
Qty: 10 TABLET | COMMUNITY

## 2024-01-23 RX ORDER — LORATADINE 10 MG
1 TABLET TABLET ORAL ONCE A DAY
COMMUNITY

## 2024-02-09 ENCOUNTER — OV EP (OUTPATIENT)
Dept: URBAN - NONMETROPOLITAN AREA CLINIC 2 | Facility: CLINIC | Age: 74
End: 2024-02-09

## 2024-02-19 ENCOUNTER — OV EP (OUTPATIENT)
Dept: URBAN - NONMETROPOLITAN AREA CLINIC 2 | Facility: CLINIC | Age: 74
End: 2024-02-19

## 2024-02-26 ENCOUNTER — OV EP (OUTPATIENT)
Dept: URBAN - NONMETROPOLITAN AREA CLINIC 2 | Facility: CLINIC | Age: 74
End: 2024-02-26
Payer: MEDICARE

## 2024-02-26 VITALS
WEIGHT: 175 LBS | DIASTOLIC BLOOD PRESSURE: 73 MMHG | SYSTOLIC BLOOD PRESSURE: 126 MMHG | BODY MASS INDEX: 39.37 KG/M2 | HEIGHT: 56 IN | HEART RATE: 72 BPM | TEMPERATURE: 98.5 F

## 2024-02-26 DIAGNOSIS — K50.10 CROHN'S DISEASE OF COLON WITHOUT COMPLICATION: ICD-10-CM

## 2024-02-26 DIAGNOSIS — Z12.11 COLON CANCER SCREENING: ICD-10-CM

## 2024-02-26 DIAGNOSIS — G35 MULTIPLE SCLEROSIS: ICD-10-CM

## 2024-02-26 DIAGNOSIS — K59.04 CHRONIC IDIOPATHIC CONSTIPATION: ICD-10-CM

## 2024-02-26 PROBLEM — 24700007: Status: ACTIVE | Noted: 2024-02-26

## 2024-02-26 PROCEDURE — 99214 OFFICE O/P EST MOD 30 MIN: CPT | Performed by: NURSE PRACTITIONER

## 2024-02-26 RX ORDER — SUCRALFATE 1 G/1
TABLET ORAL
Qty: 15 TABLET | COMMUNITY

## 2024-02-26 RX ORDER — ADALIMUMAB 40MG/0.4ML
KIT SUBCUTANEOUS
Qty: 2 EACH | COMMUNITY

## 2024-02-26 RX ORDER — GLIMEPIRIDE 4 MG/1
1 TABLET WITH BREAKFAST OR THE FIRST MAIN MEAL OF THE DAY TABLET ORAL TWICE DAILY
Status: ACTIVE | COMMUNITY

## 2024-02-26 RX ORDER — METOPROLOL SUCCINATE 50 MG/1
TABLET, EXTENDED RELEASE ORAL
Qty: 90 TABLET | COMMUNITY

## 2024-02-26 RX ORDER — SEMAGLUTIDE 0.68 MG/ML
INJECTION, SOLUTION SUBCUTANEOUS
Qty: 3 MILLILITER | Status: ON HOLD | COMMUNITY

## 2024-02-26 RX ORDER — RISANKIZUMAB-RZAA 60 MG/ML
AS DIRECTED INJECTION INTRAVENOUS
Qty: 600 | Refills: 0 | Status: ACTIVE | COMMUNITY
Start: 2023-10-11

## 2024-02-26 RX ORDER — CYANOCOBALAMIN 1000 UG/ML
1 ML INJECTION INTRAMUSCULAR; SUBCUTANEOUS
Status: ACTIVE | COMMUNITY

## 2024-02-26 RX ORDER — GLATIRAMER 40 MG/ML
AS DIRECTED INJECTION, SOLUTION SUBCUTANEOUS
Status: ACTIVE | COMMUNITY

## 2024-02-26 RX ORDER — ATORVASTATIN CALCIUM, FILM COATED 40 MG/1
TAKE 1 TABLET BY MOUTH EVERYDAY AT BEDTIME TABLET ORAL
Qty: 90 EACH | Refills: 1 | COMMUNITY

## 2024-02-26 RX ORDER — ARIPIPRAZOLE 5 MG/1
1/2 TABLET TABLET ORAL ONCE A DAY
COMMUNITY

## 2024-02-26 RX ORDER — GABAPENTIN 300 MG/1
1 CAPSULE CAPSULE ORAL TWICE DAILY
COMMUNITY

## 2024-02-26 RX ORDER — BUPROPION HYDROCHLORIDE 300 MG/1
1 TABLET IN THE MORNING TABLET, FILM COATED, EXTENDED RELEASE ORAL ONCE A DAY
Status: ACTIVE | COMMUNITY

## 2024-02-26 RX ORDER — LIDOCAINE 50 MG/G
1 PATCH APPLIED TOPICALLY FOR 12 HRS ONCE A DAY AS NEEDED FOR PAIN 30 DAY(S) PATCH CUTANEOUS
Qty: 30 EACH | Refills: 0 | COMMUNITY

## 2024-02-26 RX ORDER — FERROUS SULFATE 325(65) MG
1 TABLET TABLET ORAL BID
Qty: 60 TABLET | Refills: 11 | Status: ACTIVE | COMMUNITY
Start: 2021-01-11

## 2024-02-26 RX ORDER — ACETAMINOPHEN AND CODEINE PHOSPHATE 300; 30 MG/1; MG/1
TABLET ORAL
Qty: 24 TABLET | COMMUNITY

## 2024-02-26 RX ORDER — LORATADINE 10 MG
1 TABLET TABLET ORAL ONCE A DAY
COMMUNITY

## 2024-02-26 RX ORDER — SITAGLIPTIN AND METFORMIN HYDROCHLORIDE 50; 1000 MG/1; MG/1
1 TABLET WITH MEALS TABLET, FILM COATED ORAL TWICE A DAY
Status: ACTIVE | COMMUNITY

## 2024-02-26 RX ORDER — RISANKIZUMAB-RZAA 180 MG/1.2
AT WEEK 12, THEN EVERY 8 WEEKS KIT SUBCUTANEOUS
Qty: 1 UNSPECIFIED | Refills: 6 | Status: ACTIVE | COMMUNITY
Start: 2023-10-11 | End: 2024-11-05

## 2024-02-26 RX ORDER — LORAZEPAM 0.5 MG/1
1 TABLET TABLET ORAL AS NEEDED
Status: ACTIVE | COMMUNITY

## 2024-02-26 RX ORDER — DICYCLOMINE HYDROCHLORIDE 20 MG/1
TABLET ORAL
Qty: 10 TABLET | COMMUNITY

## 2024-02-26 RX ORDER — TRAZODONE HYDROCHLORIDE 50 MG/1
TAKE 1/2 TABLET BY MOUTH AT BEDTIME TABLET ORAL
Qty: 15 EACH | Refills: 0 | COMMUNITY

## 2024-02-26 RX ORDER — LAMOTRIGINE 100 MG/1
1 TABLET TABLET ORAL ONCE A DAY
Status: ACTIVE | COMMUNITY

## 2024-02-26 RX ORDER — VITAMIN A 2400 MCG
1 TABLET CAPSULE ORAL TWICE DAILY
COMMUNITY

## 2024-02-26 NOTE — HPI-TODAY'S VISIT:
Marybeth presents for evaluation of Crohn's colitis.  She was originally diagnosed in 2018 by Dr. Robbin Mix per her report, she states that she developed acute diarrhea and was told that she had Crohn's colitis.  She was never treated with any remission therapy, she thinks that she took steroids.  She had no issues over the past 2 years but recently has had increased tenesmus and urgency with incontinence.  She had a repeat colonoscopy in June by Dr. Robbin Mix with active Crohn's colitis.  He recommended Humira which she started 2 weeks ago.  She has taken her first 2 induction doses, she is due for maintenance.  She is switching providers given issues with communication with Dr. Mix's office.  She states that she has a bowel movement most days of the week with urgency and tenesmus, at times she has 2-3 urgent bowel movements in a day, however she does have episodes of incomplete evacuation in between these periods.  She denies any mucus or bleeding.  We do not have a copy of her records from Dr. Mix.  She has not had any blood work recently.  Today she is here to establish care, and she is otherwise doing well.  MB   10/19/2020 Marybeth presents for follow-up of Crohn's colitis.  Since her last visit we did get Dr. Mix's office notes but have not received his colonoscopy or pathology results.  She is doing well on Humira every other week with no complaints regarding her injection.  Now her bowels are more alternating, she will have no bowel movement for 2 to 3 days, passed a large hard stool and then have some loose stool after this.  The cycle will start over following.  She did meet with Sue and is trying to follow an anti-inflammatory diet.  She denies any mucus or bleeding associated with the symptoms.  She has not had any blood work since her last visit, today she is doing well otherwise.  MB   1/11/2021  Marybeth presents for followup of Crohn's colitis. She is doing well today on Humira every other week. She has no significant diarrhea so long as she maintains a low fiber diet. She is due for lab work. She is moving into [facility] and will need a paper rx for some prescriptions. Today she is doing well otherwise. MB  7/12/2021 Mrs. Cho presents for follow-up of Crohn's disease.  Since her last visit she has been doing well in regard to her disease.  She is on Colace daily with no symptoms of flare.  She denies any abdominal pain or rectal bleeding.  She has had some mild local reactions to her injections with some swelling.  She has no systemic complaints.  She is in the [place] study locally at [facility] following with her Covid antibodies.  She now has low antibody levels.  She has been recommended for an immunologic work-up per the [place] team.  Her last colonoscopy was done in 2019 by Dr. Robbin Mix with Crohn's colitis.  We still do not have the actual report but we do have his office notes with moderate active disease.  Today she agrees to repeat colonoscopy for surveillance, she does agree to Humira serologies to ensure no antibody development given local reactions, and we have discussed referral to allergy and immunology for evaluation of poor response to the Covid vaccine.  MB 11/30/2021 Marybeth presents for colonoscopy follow-up.  Her labs show no Humira antibodies, she does have low normal serum drug level.  Her colonoscopy reveals 3 inflammatory polyps, right-sided inflammation on colonoscopy, normal terminal ileum.  Her biopsy showed active inflammation in the left and right colon.  She is having 1-2 bowel movements daily.  These are soft at times however she has been on Colace twice daily.  Today we discussed decreasing that.  She denies any urgency, mucus or bleeding.  We did discuss her Humira serologies and that given her low normal serum drug level and active inflammation we could consider going to weekly dosing.  For now she wants to continue her biweekly dosing and continue to monitor her symptoms.  She agrees to try you serous and increase her Humira to weekly if she flares.  MB 5/17/2022 Marybeth presents for follow-up of Crohn's disease.  Since her last visit she is actually been doing very well.  She is taking your Humira injection every other week.  She is had no flares.  She is having 1-2 soft bowel movement daily.  She is up-to-date on her skin cancer screening.  She is now following with Dr. Ratna bloom and will discuss a Pap smear with her.  She is due for repeat labs today.  Otherwise she has no new GI complaints.  She would be due for repeat surveillance colonoscopy next year.  MB 1/25/2023 Marybeth presents for follow-up of Crohn's disease.  She is doing well Humira 40 mg every 2 weeks.  She denies any flares.  She has 1-2 soft bowel movements daily with no bleeding or abdominal pain.  Today we have discussed Humira long-term.  She is interested in discontinuing therapy.  We have discussed risks and benefits of Humira along with unmanaged Crohn's disease.  She is due for repeat surveillance colonoscopy this October.  We will follow-up depending on her endoscopic findings.  Today she is doing well otherwise and due for repeat lab work.  MB 8/16/2023 Marybeth presents for follow-up of Crohn's disease.  Since her last visit she underwent coronary artery bypass graft by Dr. Inman in February.  She was told to hold her Humira 4 weeks before and 4 weeks after but she never restarted.  Postoperatively she developed constipation, she is been taking stool softeners with persistent constipation.  She denies any bleeding or diarrhea.  She recently started Ozempic and developed acute left-sided abdominal pain.  She does agree to hold this for now.  We have discussed restarting Humira versus an alternative biologic with her heart disease.  Today Sherrie check her labs along with her Humira serologies.  We will consider reinduction with Humira versus colonoscopy and alternative biologic therapy pending her results.  Today she is actually doing fairly well in regard to her GI complaints.  She is doing well in regard to her heart disease, she had an uncomplicated postop course.  MB 10/11/2023 Marybeth presents for follow-up of Crohn's colitis.  Since her last visit her Humira titers are elevated with positive antibodies at 60.  Her colonoscopy shows active pancolitis with active disease on biopsies.  She is going up to 5 days without a bowel movement on MiraLAX daily, she is not consistent taking this on a regular basis and agrees to try and be more vigilant on taking this daily and adding Colace.  Today she continues to struggle with her bowel movements and she has active Crohn's colitis.  We have discussed risks and benefits of remission therapy, she has failed Humira 40 mg subcutaneously, we will pursue Skyrizi with induction at 600 mg at week 04 and 8 and then 180 mg subcu at week 12 then it every 8 weeks with the on body injector.  MB 2/26/2024 Maggy presents for follow-up of Crohn's disease.  She is status post induction of Skyrizi and took her first maintenance dose this past week.  She has home health care for her MS therapy and the home health care nurse was able to help her with this.  She had no side effects from Skyrizi.  She remains somewhat constipated.  She is off semaglutide.  She is on MiraLAX daily and 2 Colace at night with a bowel movement daily with no bleeding or mucus.  She denies any abdominal pain.  Today she is doing well otherwise and agrees to repeat labs.  MB

## 2024-02-27 LAB
A/G RATIO: 1.4
ABSOLUTE BASOPHILS: 50
ABSOLUTE EOSINOPHILS: 298
ABSOLUTE LYMPHOCYTES: 2208
ABSOLUTE MONOCYTES: 575
ABSOLUTE NEUTROPHILS: 3969
ALBUMIN: 3.9
ALKALINE PHOSPHATASE: 63
ALT (SGPT): 19
AST (SGOT): 16
BASOPHILS: 0.7
BILIRUBIN, TOTAL: 0.3
BUN/CREATININE RATIO: (no result)
BUN: 18
C-REACTIVE PROTEIN, QUANT: 2.7
CALCIUM: 9.6
CARBON DIOXIDE, TOTAL: 27
CHLORIDE: 101
CREATININE: 0.86
EGFR: 72
EOSINOPHILS: 4.2
GLOBULIN, TOTAL: 2.8
GLUCOSE: 190
HEMATOCRIT: 37.3
HEMOGLOBIN: 12.4
LYMPHOCYTES: 31.1
MCH: 28
MCHC: 33.2
MCV: 84.2
MONOCYTES: 8.1
MPV: 9.3
NEUTROPHILS: 55.9
PLATELET COUNT: 208
POTASSIUM: 4.6
PROTEIN, TOTAL: 6.7
RDW: 13.9
RED BLOOD CELL COUNT: 4.43
SED RATE BY MODIFIED: 22
SODIUM: 140
WHITE BLOOD CELL COUNT: 7.1

## 2024-08-26 ENCOUNTER — DASHBOARD ENCOUNTERS (OUTPATIENT)
Age: 74
End: 2024-08-26

## 2024-08-26 ENCOUNTER — OFFICE VISIT (OUTPATIENT)
Dept: URBAN - NONMETROPOLITAN AREA CLINIC 2 | Facility: CLINIC | Age: 74
End: 2024-08-26
Payer: MEDICARE

## 2024-08-26 VITALS
HEIGHT: 56 IN | DIASTOLIC BLOOD PRESSURE: 65 MMHG | HEART RATE: 72 BPM | WEIGHT: 180 LBS | SYSTOLIC BLOOD PRESSURE: 102 MMHG | BODY MASS INDEX: 40.49 KG/M2

## 2024-08-26 DIAGNOSIS — K59.04 CHRONIC IDIOPATHIC CONSTIPATION: ICD-10-CM

## 2024-08-26 DIAGNOSIS — K50.10 CROHN'S DISEASE OF COLON WITHOUT COMPLICATION: ICD-10-CM

## 2024-08-26 DIAGNOSIS — G35 MULTIPLE SCLEROSIS: ICD-10-CM

## 2024-08-26 DIAGNOSIS — Z86.010 COLON POLYP HISTORY: ICD-10-CM

## 2024-08-26 PROCEDURE — 99214 OFFICE O/P EST MOD 30 MIN: CPT | Performed by: NURSE PRACTITIONER

## 2024-08-26 RX ORDER — METOPROLOL SUCCINATE 50 MG/1
TABLET, EXTENDED RELEASE ORAL
Qty: 90 TABLET | COMMUNITY

## 2024-08-26 RX ORDER — LORATADINE 10 MG
1 TABLET TABLET ORAL ONCE A DAY
COMMUNITY

## 2024-08-26 RX ORDER — SITAGLIPTIN AND METFORMIN HYDROCHLORIDE 50; 1000 MG/1; MG/1
1 TABLET WITH MEALS TABLET, FILM COATED ORAL TWICE A DAY
Status: ACTIVE | COMMUNITY

## 2024-08-26 RX ORDER — DICYCLOMINE HYDROCHLORIDE 20 MG/1
TABLET ORAL
Qty: 10 TABLET | COMMUNITY

## 2024-08-26 RX ORDER — ARIPIPRAZOLE 5 MG/1
1/2 TABLET TABLET ORAL ONCE A DAY
COMMUNITY

## 2024-08-26 RX ORDER — SEMAGLUTIDE 0.68 MG/ML
INJECTION, SOLUTION SUBCUTANEOUS
Qty: 3 MILLILITER | Status: ON HOLD | COMMUNITY

## 2024-08-26 RX ORDER — FERROUS SULFATE 325(65) MG
1 TABLET TABLET ORAL BID
Qty: 60 TABLET | Refills: 11 | Status: ACTIVE | COMMUNITY
Start: 2021-01-11

## 2024-08-26 RX ORDER — GLIMEPIRIDE 4 MG/1
1 TABLET WITH BREAKFAST OR THE FIRST MAIN MEAL OF THE DAY TABLET ORAL TWICE DAILY
Status: ACTIVE | COMMUNITY

## 2024-08-26 RX ORDER — RISANKIZUMAB-RZAA 60 MG/ML
AS DIRECTED INJECTION INTRAVENOUS
Qty: 600 | Refills: 0 | Status: ACTIVE | COMMUNITY
Start: 2023-10-11

## 2024-08-26 RX ORDER — ACETAMINOPHEN AND CODEINE PHOSPHATE 300; 30 MG/1; MG/1
TABLET ORAL
Qty: 24 TABLET | COMMUNITY

## 2024-08-26 RX ORDER — ADALIMUMAB 40MG/0.4ML
KIT SUBCUTANEOUS
Qty: 2 EACH | COMMUNITY

## 2024-08-26 RX ORDER — ATORVASTATIN CALCIUM, FILM COATED 40 MG/1
TAKE 1 TABLET BY MOUTH EVERYDAY AT BEDTIME TABLET ORAL
Qty: 90 EACH | Refills: 1 | COMMUNITY

## 2024-08-26 RX ORDER — LAMOTRIGINE 100 MG/1
1 TABLET TABLET ORAL ONCE A DAY
Status: ACTIVE | COMMUNITY

## 2024-08-26 RX ORDER — GLATIRAMER 40 MG/ML
AS DIRECTED INJECTION, SOLUTION SUBCUTANEOUS
Status: ACTIVE | COMMUNITY

## 2024-08-26 RX ORDER — GABAPENTIN 300 MG/1
1 CAPSULE CAPSULE ORAL TWICE DAILY
COMMUNITY

## 2024-08-26 RX ORDER — RISANKIZUMAB-RZAA 180 MG/1.2
AT WEEK 12, THEN EVERY 8 WEEKS KIT SUBCUTANEOUS
Qty: 1 UNSPECIFIED | Refills: 6 | Status: ACTIVE | COMMUNITY
Start: 2023-10-11 | End: 2024-11-05

## 2024-08-26 RX ORDER — LIDOCAINE 50 MG/G
1 PATCH APPLIED TOPICALLY FOR 12 HRS ONCE A DAY AS NEEDED FOR PAIN 30 DAY(S) PATCH CUTANEOUS
Qty: 30 EACH | Refills: 0 | COMMUNITY

## 2024-08-26 RX ORDER — SUCRALFATE 1 G/1
TABLET ORAL
Qty: 15 TABLET | COMMUNITY

## 2024-08-26 RX ORDER — RISANKIZUMAB-RZAA 180 MG/1.2
INSERT 1 CARTRIDGE INTO ON-BODY INJECTOR AND INJECT 180 MG UNDER THE SKIN ON WEEK 12 THEN EVERY 8 WEEKS THEREAFTER KIT SUBCUTANEOUS
Qty: 2 | Refills: 3 | Status: ACTIVE | COMMUNITY

## 2024-08-26 RX ORDER — VITAMIN A 2400 MCG
1 TABLET CAPSULE ORAL TWICE DAILY
COMMUNITY

## 2024-08-26 RX ORDER — LORAZEPAM 0.5 MG/1
1 TABLET TABLET ORAL AS NEEDED
Status: ACTIVE | COMMUNITY

## 2024-08-26 RX ORDER — TRAZODONE HYDROCHLORIDE 50 MG/1
TAKE 1/2 TABLET BY MOUTH AT BEDTIME TABLET ORAL
Qty: 15 EACH | Refills: 0 | COMMUNITY

## 2024-08-26 RX ORDER — BUPROPION HYDROCHLORIDE 300 MG/1
1 TABLET IN THE MORNING TABLET, FILM COATED, EXTENDED RELEASE ORAL ONCE A DAY
Status: ACTIVE | COMMUNITY

## 2024-08-26 RX ORDER — CYANOCOBALAMIN 1000 UG/ML
1 ML INJECTION INTRAMUSCULAR; SUBCUTANEOUS
Status: ACTIVE | COMMUNITY

## 2024-08-26 NOTE — HPI-TODAY'S VISIT:
Marybeth presents for evaluation of Crohn's colitis.  She was originally diagnosed in 2018 by Dr. Robbin Mix per her report, she states that she developed acute diarrhea and was told that she had Crohn's colitis.  She was never treated with any remission therapy, she thinks that she took steroids.  She had no issues over the past 2 years but recently has had increased tenesmus and urgency with incontinence.  She had a repeat colonoscopy in June by Dr. Robbin Mix with active Crohn's colitis.  He recommended Humira which she started 2 weeks ago.  She has taken her first 2 induction doses, she is due for maintenance.  She is switching providers given issues with communication with Dr. Mix's office.  She states that she has a bowel movement most days of the week with urgency and tenesmus, at times she has 2-3 urgent bowel movements in a day, however she does have episodes of incomplete evacuation in between these periods.  She denies any mucus or bleeding.  We do not have a copy of her records from Dr. Mix.  She has not had any blood work recently.  Today she is here to establish care, and she is otherwise doing well.  MB   10/19/2020 Marybeth presents for follow-up of Crohn's colitis.  Since her last visit we did get Dr. Mix's office notes but have not received his colonoscopy or pathology results.  She is doing well on Humira every other week with no complaints regarding her injection.  Now her bowels are more alternating, she will have no bowel movement for 2 to 3 days, passed a large hard stool and then have some loose stool after this.  The cycle will start over following.  She did meet with Sue and is trying to follow an anti-inflammatory diet.  She denies any mucus or bleeding associated with the symptoms.  She has not had any blood work since her last visit, today she is doing well otherwise.  MB   1/11/2021  Marybeth presents for followup of Crohn's colitis. She is doing well today on Humira every other week. She has no significant diarrhea so long as she maintains a low fiber diet. She is due for lab work. She is moving into [place] and will need a paper rx for some prescriptions. Today she is doing well otherwise. MB  7/12/2021 Mrs. Cho presents for follow-up of Crohn's disease.  Since her last visit she has been doing well in regard to her disease.  She is on Colace daily with no symptoms of flare.  She denies any abdominal pain or rectal bleeding.  She has had some mild local reactions to her injections with some swelling.  She has no systemic complaints.  She is in the [place] study locally at [facility] following with her Covid antibodies.  She now has low antibody levels.  She has been recommended for an immunologic work-up per the [place] team.  Her last colonoscopy was done in 2019 by Dr. Robbin Mix with Crohn's colitis.  We still do not have the actual report but we do have his office notes with moderate active disease.  Today she agrees to repeat colonoscopy for surveillance, she does agree to Humira serologies to ensure no antibody development given local reactions, and we have discussed referral to allergy and immunology for evaluation of poor response to the Covid vaccine.  MB 11/30/2021 Marybeth presents for colonoscopy follow-up.  Her labs show no Humira antibodies, she does have low normal serum drug level.  Her colonoscopy reveals 3 inflammatory polyps, right-sided inflammation on colonoscopy, normal terminal ileum.  Her biopsy showed active inflammation in the left and right colon.  She is having 1-2 bowel movements daily.  These are soft at times however she has been on Colace twice daily.  Today we discussed decreasing that.  She denies any urgency, mucus or bleeding.  We did discuss her Humira serologies and that given her low normal serum drug level and active inflammation we could consider going to weekly dosing.  For now she wants to continue her biweekly dosing and continue to monitor her symptoms.  She agrees to try you serous and increase her Humira to weekly if she flares.  MB 5/17/2022 Marybeth presents for follow-up of Crohn's disease.  Since her last visit she is actually been doing very well.  She is taking your Humira injection every other week.  She is had no flares.  She is having 1-2 soft bowel movement daily.  She is up-to-date on her skin cancer screening.  She is now following with Dr. Ratna bloom and will discuss a Pap smear with her.  She is due for repeat labs today.  Otherwise she has no new GI complaints.  She would be due for repeat surveillance colonoscopy next year.  MB 1/25/2023 Marybeth presents for follow-up of Crohn's disease.  She is doing well Humira 40 mg every 2 weeks.  She denies any flares.  She has 1-2 soft bowel movements daily with no bleeding or abdominal pain.  Today we have discussed Humira long-term.  She is interested in discontinuing therapy.  We have discussed risks and benefits of Humira along with unmanaged Crohn's disease.  She is due for repeat surveillance colonoscopy this October.  We will follow-up depending on her endoscopic findings.  Today she is doing well otherwise and due for repeat lab work.  MB 8/16/2023 Marybeth presents for follow-up of Crohn's disease.  Since her last visit she underwent coronary artery bypass graft by Dr. Inman in February.  She was told to hold her Humira 4 weeks before and 4 weeks after but she never restarted.  Postoperatively she developed constipation, she is been taking stool softeners with persistent constipation.  She denies any bleeding or diarrhea.  She recently started Ozempic and developed acute left-sided abdominal pain.  She does agree to hold this for now.  We have discussed restarting Humira versus an alternative biologic with her heart disease.  Today Sherrie check her labs along with her Humira serologies.  We will consider reinduction with Humira versus colonoscopy and alternative biologic therapy pending her results.  Today she is actually doing fairly well in regard to her GI complaints.  She is doing well in regard to her heart disease, she had an uncomplicated postop course.  MB 10/11/2023 Marybeth presents for follow-up of Crohn's colitis.  Since her last visit her Humira titers are elevated with positive antibodies at 60.  Her colonoscopy shows active pancolitis with active disease on biopsies.  She is going up to 5 days without a bowel movement on MiraLAX daily, she is not consistent taking this on a regular basis and agrees to try and be more vigilant on taking this daily and adding Colace.  Today she continues to struggle with her bowel movements and she has active Crohn's colitis.  We have discussed risks and benefits of remission therapy, she has failed Humira 40 mg subcutaneously, we will pursue Skyrizi with induction at 600 mg at week 04 and 8 and then 180 mg subcu at week 12 then it every 8 weeks with the on body injector.  MB 2/26/2024 Maggy presents for follow-up of Crohn's disease.  She is status post induction of Skyrizi and took her first maintenance dose this past week.  She has home health care for her MS therapy and the home health care nurse was able to help her with this.  She had no side effects from Skyrizi.  She remains somewhat constipated.  She is off semaglutide.  She is on MiraLAX daily and 2 Colace at night with a bowel movement daily with no bleeding or mucus.  She denies any abdominal pain.  Today she is doing well otherwise and agrees to repeat labs.  MB 8/26/2024 Maggy presents for follow-up.  Since her last visit she has been doing great on Skyrizi every 8 weeks.  She is on stool softeners and MiraLAX daily.  She is having a soft bowel movement daily to every other day with no abdominal pain or bleeding.  Today she is due for repeat labs.  She denies any GI complaints.  She will be due for surveillance colonoscopy next year.  MB

## 2024-08-28 LAB
A/G RATIO: 1.5
ABSOLUTE BASOPHILS: 72
ABSOLUTE EOSINOPHILS: 461
ABSOLUTE LYMPHOCYTES: 2167
ABSOLUTE MONOCYTES: 605
ABSOLUTE NEUTROPHILS: 3895
ALBUMIN: 4.1
ALKALINE PHOSPHATASE: 83
ALT (SGPT): 20
AST (SGOT): 19
BASOPHILS: 1
BILIRUBIN, TOTAL: 0.4
BUN/CREATININE RATIO: 16
BUN: 18
CALCIUM: 9.5
CARBON DIOXIDE, TOTAL: 28
CHLORIDE: 99
CREATININE: 1.11
EGFR: 53
EOSINOPHILS: 6.4
GLOBULIN, TOTAL: 2.8
GLUCOSE: 240
HEMATOCRIT: 43
HEMOGLOBIN: 13.9
HEPATITIS B CORE AB TOTAL: (no result)
HEPATITIS B SURFACE ANTIGEN: (no result)
LYMPHOCYTES: 30.1
MCH: 29.1
MCHC: 32.3
MCV: 90.1
MITOGEN-NIL: >10
MONOCYTES: 8.4
MPV: 8.7
NEUTROPHILS: 54.1
PLATELET COUNT: 164
POTASSIUM: 5.3
PROTEIN, TOTAL: 6.9
QUANTIFERON NIL VALUE: 0.03
QUANTIFERON TB1 AG VALUE: 0
QUANTIFERON TB2 AG VALUE: 0
QUANTIFERON-TB GOLD PLUS: NEGATIVE
RDW: 14.3
RED BLOOD CELL COUNT: 4.77
SODIUM: 137
WHITE BLOOD CELL COUNT: 7.2

## 2024-08-29 ENCOUNTER — TELEPHONE ENCOUNTER (OUTPATIENT)
Dept: URBAN - NONMETROPOLITAN AREA CLINIC 2 | Facility: CLINIC | Age: 74
End: 2024-08-29

## 2024-10-22 ENCOUNTER — TELEPHONE ENCOUNTER (OUTPATIENT)
Dept: URBAN - NONMETROPOLITAN AREA CLINIC 13 | Facility: CLINIC | Age: 74
End: 2024-10-22

## 2024-11-22 ENCOUNTER — TELEPHONE ENCOUNTER (OUTPATIENT)
Dept: URBAN - NONMETROPOLITAN AREA CLINIC 2 | Facility: CLINIC | Age: 74
End: 2024-11-22

## 2024-11-22 PROBLEM — 409966000: Status: ACTIVE | Noted: 2024-11-22

## 2024-11-22 RX ORDER — COLESTIPOL HYDROCHLORIDE 1 G/1
1 TABLET DAILY FOR DIARRHEA TABLET, FILM COATED ORAL
Qty: 30 | Refills: 11 | OUTPATIENT
Start: 2024-11-22

## 2024-12-12 ENCOUNTER — TELEPHONE ENCOUNTER (OUTPATIENT)
Dept: URBAN - NONMETROPOLITAN AREA CLINIC 2 | Facility: CLINIC | Age: 74
End: 2024-12-12

## 2024-12-16 ENCOUNTER — TELEPHONE ENCOUNTER (OUTPATIENT)
Dept: URBAN - NONMETROPOLITAN AREA CLINIC 2 | Facility: CLINIC | Age: 74
End: 2024-12-16

## 2025-02-13 ENCOUNTER — TELEPHONE ENCOUNTER (OUTPATIENT)
Dept: URBAN - NONMETROPOLITAN AREA CLINIC 2 | Facility: CLINIC | Age: 75
End: 2025-02-13

## 2025-02-13 RX ORDER — RISANKIZUMAB-RZAA 180 MG/1.2
1.2ML EVERY 8 WEEKS KIT SUBCUTANEOUS
Qty: 1 | Refills: 5

## 2025-02-21 ENCOUNTER — TELEPHONE ENCOUNTER (OUTPATIENT)
Dept: URBAN - NONMETROPOLITAN AREA CLINIC 2 | Facility: CLINIC | Age: 75
End: 2025-02-21

## 2025-03-24 ENCOUNTER — LAB OUTSIDE AN ENCOUNTER (OUTPATIENT)
Dept: URBAN - NONMETROPOLITAN AREA CLINIC 2 | Facility: CLINIC | Age: 75
End: 2025-03-24

## 2025-03-24 ENCOUNTER — OFFICE VISIT (OUTPATIENT)
Dept: URBAN - NONMETROPOLITAN AREA CLINIC 2 | Facility: CLINIC | Age: 75
End: 2025-03-24
Payer: MEDICARE

## 2025-03-24 DIAGNOSIS — G35 MULTIPLE SCLEROSIS: ICD-10-CM

## 2025-03-24 DIAGNOSIS — K59.04 CHRONIC IDIOPATHIC CONSTIPATION: ICD-10-CM

## 2025-03-24 DIAGNOSIS — K50.10 CROHN'S DISEASE OF COLON WITHOUT COMPLICATION: ICD-10-CM

## 2025-03-24 DIAGNOSIS — Z12.11 COLON CANCER SCREENING: ICD-10-CM

## 2025-03-24 PROCEDURE — 99214 OFFICE O/P EST MOD 30 MIN: CPT | Performed by: NURSE PRACTITIONER

## 2025-03-24 RX ORDER — LORATADINE 10 MG
1 TABLET TABLET ORAL ONCE A DAY
COMMUNITY

## 2025-03-24 RX ORDER — GLATIRAMER 40 MG/ML
AS DIRECTED INJECTION, SOLUTION SUBCUTANEOUS
Status: ACTIVE | COMMUNITY

## 2025-03-24 RX ORDER — ACETAMINOPHEN AND CODEINE PHOSPHATE 300; 30 MG/1; MG/1
TABLET ORAL
Qty: 24 TABLET | COMMUNITY

## 2025-03-24 RX ORDER — GABAPENTIN 300 MG/1
1 CAPSULE CAPSULE ORAL TWICE DAILY
COMMUNITY

## 2025-03-24 RX ORDER — COLESTIPOL HYDROCHLORIDE 1 G/1
1 TABLET DAILY FOR DIARRHEA TABLET, FILM COATED ORAL
Qty: 30 | Refills: 11 | Status: ACTIVE | COMMUNITY
Start: 2024-11-22

## 2025-03-24 RX ORDER — SITAGLIPTIN AND METFORMIN HYDROCHLORIDE 50; 1000 MG/1; MG/1
1 TABLET WITH MEALS TABLET, FILM COATED ORAL TWICE A DAY
Status: ACTIVE | COMMUNITY

## 2025-03-24 RX ORDER — RISANKIZUMAB-RZAA 60 MG/ML
AS DIRECTED INJECTION INTRAVENOUS
Qty: 600 | Refills: 0 | Status: ACTIVE | COMMUNITY
Start: 2023-10-11

## 2025-03-24 RX ORDER — BUPROPION HYDROCHLORIDE 300 MG/1
1 TABLET IN THE MORNING TABLET, FILM COATED, EXTENDED RELEASE ORAL ONCE A DAY
Status: ACTIVE | COMMUNITY

## 2025-03-24 RX ORDER — GLIMEPIRIDE 4 MG/1
1 TABLET WITH BREAKFAST OR THE FIRST MAIN MEAL OF THE DAY TABLET ORAL TWICE DAILY
Status: ACTIVE | COMMUNITY

## 2025-03-24 RX ORDER — TRAZODONE HYDROCHLORIDE 50 MG/1
TAKE 1/2 TABLET BY MOUTH AT BEDTIME TABLET ORAL
Qty: 15 EACH | Refills: 0 | COMMUNITY

## 2025-03-24 RX ORDER — VITAMIN A 2400 MCG
1 TABLET CAPSULE ORAL TWICE DAILY
COMMUNITY

## 2025-03-24 RX ORDER — LAMOTRIGINE 100 MG/1
1 TABLET TABLET ORAL ONCE A DAY
Status: ACTIVE | COMMUNITY

## 2025-03-24 RX ORDER — LORAZEPAM 0.5 MG/1
1 TABLET TABLET ORAL AS NEEDED
Status: ACTIVE | COMMUNITY

## 2025-03-24 RX ORDER — ADALIMUMAB 40MG/0.4ML
KIT SUBCUTANEOUS
Qty: 2 EACH | COMMUNITY

## 2025-03-24 RX ORDER — DICYCLOMINE HYDROCHLORIDE 20 MG/1
TABLET ORAL
Qty: 10 TABLET | COMMUNITY

## 2025-03-24 RX ORDER — METOPROLOL SUCCINATE 50 MG/1
TABLET, EXTENDED RELEASE ORAL
Qty: 90 TABLET | COMMUNITY

## 2025-03-24 RX ORDER — ATORVASTATIN CALCIUM, FILM COATED 40 MG/1
TAKE 1 TABLET BY MOUTH EVERYDAY AT BEDTIME TABLET ORAL
Qty: 90 EACH | Refills: 1 | COMMUNITY

## 2025-03-24 RX ORDER — FERROUS SULFATE 325(65) MG
1 TABLET TABLET ORAL BID
Qty: 60 TABLET | Refills: 11 | Status: ACTIVE | COMMUNITY
Start: 2021-01-11

## 2025-03-24 RX ORDER — CYANOCOBALAMIN 1000 UG/ML
1 ML INJECTION INTRAMUSCULAR; SUBCUTANEOUS
Status: ACTIVE | COMMUNITY

## 2025-03-24 RX ORDER — RISANKIZUMAB-RZAA 180 MG/1.2
1.2ML EVERY 8 WEEKS KIT SUBCUTANEOUS
Qty: 1 | Refills: 5 | Status: ACTIVE | COMMUNITY

## 2025-03-24 RX ORDER — ARIPIPRAZOLE 5 MG/1
1/2 TABLET TABLET ORAL ONCE A DAY
COMMUNITY

## 2025-03-24 RX ORDER — SUCRALFATE 1 G/1
TABLET ORAL
Qty: 15 TABLET | COMMUNITY

## 2025-03-24 RX ORDER — SEMAGLUTIDE 0.68 MG/ML
INJECTION, SOLUTION SUBCUTANEOUS
Qty: 3 MILLILITER | Status: ON HOLD | COMMUNITY

## 2025-03-24 RX ORDER — LIDOCAINE 50 MG/G
1 PATCH APPLIED TOPICALLY FOR 12 HRS ONCE A DAY AS NEEDED FOR PAIN 30 DAY(S) PATCH CUTANEOUS
Qty: 30 EACH | Refills: 0 | COMMUNITY

## 2025-03-24 NOTE — HPI-TODAY'S VISIT:
Marybeth presents for evaluation of Crohn's colitis.  She was originally diagnosed in 2018 by Dr. Robbin Mix per her report, she states that she developed acute diarrhea and was told that she had Crohn's colitis.  She was never treated with any remission therapy, she thinks that she took steroids.  She had no issues over the past 2 years but recently has had increased tenesmus and urgency with incontinence.  She had a repeat colonoscopy in June by Dr. Robbin Mix with active Crohn's colitis.  He recommended Humira which she started 2 weeks ago.  She has taken her first 2 induction doses, she is due for maintenance.  She is switching providers given issues with communication with Dr. Mix's office.  She states that she has a bowel movement most days of the week with urgency and tenesmus, at times she has 2-3 urgent bowel movements in a day, however she does have episodes of incomplete evacuation in between these periods.  She denies any mucus or bleeding.  We do not have a copy of her records from Dr. Mix.  She has not had any blood work recently.  Today she is here to establish care, and she is otherwise doing well.  MB   10/19/2020 Marybeth presents for follow-up of Crohn's colitis.  Since her last visit we did get Dr. Mix's office notes but have not received his colonoscopy or pathology results.  She is doing well on Humira every other week with no complaints regarding her injection.  Now her bowels are more alternating, she will have no bowel movement for 2 to 3 days, passed a large hard stool and then have some loose stool after this.  The cycle will start over following.  She did meet with Sue and is trying to follow an anti-inflammatory diet.  She denies any mucus or bleeding associated with the symptoms.  She has not had any blood work since her last visit, today she is doing well otherwise.  MB   1/11/2021  Marybeth presents for followup of Crohn's colitis. She is doing well today on Humira every other week. She has no significant diarrhea so long as she maintains a low fiber diet. She is due for lab work. She is moving into [employer] and will need a paper rx for some prescriptions. Today she is doing well otherwise. MB  7/12/2021 Mrs. Cho presents for follow-up of Crohn's disease.  Since her last visit she has been doing well in regard to her disease.  She is on Colace daily with no symptoms of flare.  She denies any abdominal pain or rectal bleeding.  She has had some mild local reactions to her injections with some swelling.  She has no systemic complaints.  She is in the [place] study locally at [facility] following with her Covid antibodies.  She now has low antibody levels.  She has been recommended for an immunologic work-up per the [place] team.  Her last colonoscopy was done in 2019 by Dr. Robbin Mix with Crohn's colitis.  We still do not have the actual report but we do have his office notes with moderate active disease.  Today she agrees to repeat colonoscopy for surveillance, she does agree to Humira serologies to ensure no antibody development given local reactions, and we have discussed referral to allergy and immunology for evaluation of poor response to the Covid vaccine.  MB 11/30/2021 Marybeth presents for colonoscopy follow-up.  Her labs show no Humira antibodies, she does have low normal serum drug level.  Her colonoscopy reveals 3 inflammatory polyps, right-sided inflammation on colonoscopy, normal terminal ileum.  Her biopsy showed active inflammation in the left and right colon.  She is having 1-2 bowel movements daily.  These are soft at times however she has been on Colace twice daily.  Today we discussed decreasing that.  She denies any urgency, mucus or bleeding.  We did discuss her Humira serologies and that given her low normal serum drug level and active inflammation we could consider going to weekly dosing.  For now she wants to continue her biweekly dosing and continue to monitor her symptoms.  She agrees to try you serous and increase her Humira to weekly if she flares.  MB 5/17/2022 Marybeth presents for follow-up of Crohn's disease.  Since her last visit she is actually been doing very well.  She is taking your Humira injection every other week.  She is had no flares.  She is having 1-2 soft bowel movement daily.  She is up-to-date on her skin cancer screening.  She is now following with Dr. Ratna bloom and will discuss a Pap smear with her.  She is due for repeat labs today.  Otherwise she has no new GI complaints.  She would be due for repeat surveillance colonoscopy next year.  MB 1/25/2023 Marybeth presents for follow-up of Crohn's disease.  She is doing well Humira 40 mg every 2 weeks.  She denies any flares.  She has 1-2 soft bowel movements daily with no bleeding or abdominal pain.  Today we have discussed Humira long-term.  She is interested in discontinuing therapy.  We have discussed risks and benefits of Humira along with unmanaged Crohn's disease.  She is due for repeat surveillance colonoscopy this October.  We will follow-up depending on her endoscopic findings.  Today she is doing well otherwise and due for repeat lab work.  MB 8/16/2023 Marybeth presents for follow-up of Crohn's disease.  Since her last visit she underwent coronary artery bypass graft by Dr. Inman in February.  She was told to hold her Humira 4 weeks before and 4 weeks after but she never restarted.  Postoperatively she developed constipation, she is been taking stool softeners with persistent constipation.  She denies any bleeding or diarrhea.  She recently started Ozempic and developed acute left-sided abdominal pain.  She does agree to hold this for now.  We have discussed restarting Humira versus an alternative biologic with her heart disease.  Today Sherrie check her labs along with her Humira serologies.  We will consider reinduction with Humira versus colonoscopy and alternative biologic therapy pending her results.  Today she is actually doing fairly well in regard to her GI complaints.  She is doing well in regard to her heart disease, she had an uncomplicated postop course.  MB 10/11/2023 Marybeth presents for follow-up of Crohn's colitis.  Since her last visit her Humira titers are elevated with positive antibodies at 60.  Her colonoscopy shows active pancolitis with active disease on biopsies.  She is going up to 5 days without a bowel movement on MiraLAX daily, she is not consistent taking this on a regular basis and agrees to try and be more vigilant on taking this daily and adding Colace.  Today she continues to struggle with her bowel movements and she has active Crohn's colitis.  We have discussed risks and benefits of remission therapy, she has failed Humira 40 mg subcutaneously, we will pursue Skyrizi with induction at 600 mg at week 04 and 8 and then 180 mg subcu at week 12 then it every 8 weeks with the on body injector.  MB 2/26/2024 Maggy presents for follow-up of Crohn's disease.  She is status post induction of Skyrizi and took her first maintenance dose this past week.  She has home health care for her MS therapy and the home health care nurse was able to help her with this.  She had no side effects from Skyrizi.  She remains somewhat constipated.  She is off semaglutide.  She is on MiraLAX daily and 2 Colace at night with a bowel movement daily with no bleeding or mucus.  She denies any abdominal pain.  Today she is doing well otherwise and agrees to repeat labs.  MB 8/26/2024 Maggy presents for follow-up.  Since her last visit she has been doing great on Skyrizi every 8 weeks.  She is on stool softeners and MiraLAX daily.  She is having a soft bowel movement daily to every other day with no abdominal pain or bleeding.  Today she is due for repeat labs.  She denies any GI complaints.  She will be due for surveillance colonoscopy next year.  MB 3/24/2025 Marybeth presents for follow-up of Crohn's disease.  She is doing great on Skyrizi every 8 weeks.  She is having 1-2 soft bowel movements daily.  She does report some fecal incontinence with discharge when eating nuts.  She is on MiraLAX daily for constipation, she feels like this is too much.  We will change to low-dose psyllium to nightly.  She is due for surveillance colonoscopy along with labs today.  She denies any new GI complaints.  She has a moderate risk for sedation given her history of coronary artery bypass graft and her procedure will need to be done at the hospital.  MB Initial (On Arrival)

## 2025-08-28 ENCOUNTER — OFFICE VISIT (OUTPATIENT)
Dept: URBAN - METROPOLITAN AREA MEDICAL CENTER 1 | Facility: MEDICAL CENTER | Age: 75
End: 2025-08-28

## 2025-08-28 ENCOUNTER — LAB OUTSIDE AN ENCOUNTER (OUTPATIENT)
Dept: URBAN - NONMETROPOLITAN AREA CLINIC 2 | Facility: CLINIC | Age: 75
End: 2025-08-28
